# Patient Record
Sex: MALE | Race: WHITE | Employment: OTHER | URBAN - METROPOLITAN AREA
[De-identification: names, ages, dates, MRNs, and addresses within clinical notes are randomized per-mention and may not be internally consistent; named-entity substitution may affect disease eponyms.]

---

## 2017-05-23 ENCOUNTER — OFFICE VISIT (OUTPATIENT)
Dept: CARDIOLOGY CLINIC | Age: 72
End: 2017-05-23

## 2017-05-23 VITALS
SYSTOLIC BLOOD PRESSURE: 138 MMHG | OXYGEN SATURATION: 97 % | BODY MASS INDEX: 33.45 KG/M2 | DIASTOLIC BLOOD PRESSURE: 100 MMHG | WEIGHT: 213.6 LBS | HEART RATE: 72 BPM

## 2017-05-23 DIAGNOSIS — Z95.1 S/P CABG X 3: ICD-10-CM

## 2017-05-23 DIAGNOSIS — Z79.899 ON AMIODARONE THERAPY: ICD-10-CM

## 2017-05-23 DIAGNOSIS — I48.0 PAF (PAROXYSMAL ATRIAL FIBRILLATION) (HCC): Primary | ICD-10-CM

## 2017-05-23 PROCEDURE — 1123F ACP DISCUSS/DSCN MKR DOCD: CPT | Performed by: INTERNAL MEDICINE

## 2017-05-23 PROCEDURE — G8419 CALC BMI OUT NRM PARAM NOF/U: HCPCS | Performed by: INTERNAL MEDICINE

## 2017-05-23 PROCEDURE — 4040F PNEUMOC VAC/ADMIN/RCVD: CPT | Performed by: INTERNAL MEDICINE

## 2017-05-23 PROCEDURE — 99214 OFFICE O/P EST MOD 30 MIN: CPT | Performed by: INTERNAL MEDICINE

## 2017-05-23 PROCEDURE — G8428 CUR MEDS NOT DOCUMENT: HCPCS | Performed by: INTERNAL MEDICINE

## 2017-05-23 PROCEDURE — 1036F TOBACCO NON-USER: CPT | Performed by: INTERNAL MEDICINE

## 2017-05-23 PROCEDURE — G8599 NO ASA/ANTIPLAT THER USE RNG: HCPCS | Performed by: INTERNAL MEDICINE

## 2017-05-23 PROCEDURE — 3017F COLORECTAL CA SCREEN DOC REV: CPT | Performed by: INTERNAL MEDICINE

## 2017-05-23 RX ORDER — AMIODARONE HYDROCHLORIDE 200 MG/1
100 TABLET ORAL DAILY
Qty: 30 TABLET | Refills: 0 | Status: SHIPPED | OUTPATIENT
Start: 2017-05-23 | End: 2017-08-03 | Stop reason: SDUPTHER

## 2017-05-23 RX ORDER — ATORVASTATIN CALCIUM 40 MG/1
40 TABLET, FILM COATED ORAL DAILY
Qty: 90 TABLET | Refills: 0 | Status: SHIPPED | OUTPATIENT
Start: 2017-05-23 | End: 2018-01-19 | Stop reason: SDUPTHER

## 2017-07-13 ENCOUNTER — HOSPITAL ENCOUNTER (OUTPATIENT)
Dept: GENERAL RADIOLOGY | Age: 72
Discharge: HOME | End: 2017-07-13
Attending: INTERNAL MEDICINE | Admitting: INTERNAL MEDICINE

## 2017-07-13 DIAGNOSIS — Z95.1 S/P CABG X 3: ICD-10-CM

## 2017-07-13 LAB
ALBUMIN SERPL-MCNC: 4.5 GM/DL (ref 3.4–5)
ALBUMIN SERPL-MCNC: 4.5 GM/DL (ref 3.4–5)
ALP BLD-CCNC: 61 IU/L (ref 40–129)
ALP BLD-CCNC: 63 IU/L (ref 40–128)
ALT SERPL-CCNC: 17 U/L (ref 10–40)
ALT SERPL-CCNC: 18 U/L (ref 10–40)
ANION GAP SERPL CALCULATED.3IONS-SCNC: 15 MMOL/L (ref 4–16)
AST SERPL-CCNC: 19 IU/L (ref 15–37)
AST SERPL-CCNC: 20 IU/L (ref 15–37)
BASOPHILS ABSOLUTE: 0.1 K/CU MM
BASOPHILS RELATIVE PERCENT: 0.9 % (ref 0–1)
BILIRUB SERPL-MCNC: 0.5 MG/DL (ref 0–1)
BILIRUB SERPL-MCNC: 0.6 MG/DL (ref 0–1)
BILIRUBIN DIRECT: 0.2 MG/DL (ref 0–0.3)
BILIRUBIN, INDIRECT: 0.3 MG/DL (ref 0–0.7)
BUN BLDV-MCNC: 10 MG/DL (ref 6–23)
CALCIUM SERPL-MCNC: 9.4 MG/DL (ref 8.3–10.6)
CHLORIDE BLD-SCNC: 99 MMOL/L (ref 99–110)
CHOLESTEROL: 217 MG/DL
CO2: 24 MMOL/L (ref 21–32)
CREAT SERPL-MCNC: 0.8 MG/DL (ref 0.9–1.3)
DIFFERENTIAL TYPE: ABNORMAL
EOSINOPHILS ABSOLUTE: 0.2 K/CU MM
EOSINOPHILS RELATIVE PERCENT: 3.4 % (ref 0–3)
GFR AFRICAN AMERICAN: >60 ML/MIN/1.73M2
GFR NON-AFRICAN AMERICAN: >60 ML/MIN/1.73M2
GLUCOSE FASTING: 95 MG/DL (ref 70–99)
HCT VFR BLD CALC: 44.7 % (ref 42–52)
HDLC SERPL-MCNC: 59 MG/DL
HEMOGLOBIN: 15.3 GM/DL (ref 13.5–18)
IMMATURE NEUTROPHIL %: 0.1 % (ref 0–0.43)
LDL CHOLESTEROL DIRECT: 157 MG/DL
LYMPHOCYTES ABSOLUTE: 1.9 K/CU MM
LYMPHOCYTES RELATIVE PERCENT: 27.4 % (ref 24–44)
MCH RBC QN AUTO: 32.2 PG (ref 27–31)
MCHC RBC AUTO-ENTMCNC: 34.2 % (ref 32–36)
MCV RBC AUTO: 94.1 FL (ref 78–100)
MONOCYTES ABSOLUTE: 0.7 K/CU MM
MONOCYTES RELATIVE PERCENT: 10.1 % (ref 0–4)
NUCLEATED RBC %: 0 %
PDW BLD-RTO: 12.6 % (ref 11.7–14.9)
PLATELET # BLD: 262 K/CU MM (ref 140–440)
PMV BLD AUTO: 9.3 FL (ref 7.5–11.1)
POTASSIUM SERPL-SCNC: 4.5 MMOL/L (ref 3.5–5.1)
RBC # BLD: 4.75 M/CU MM (ref 4.6–6.2)
SEGMENTED NEUTROPHILS ABSOLUTE COUNT: 4.1 K/CU MM
SEGMENTED NEUTROPHILS RELATIVE PERCENT: 58.1 % (ref 36–66)
SODIUM BLD-SCNC: 138 MMOL/L (ref 135–145)
T3 FREE: 2.3 PG/ML (ref 2.3–4.2)
T4 FREE: 1.48 NG/DL (ref 0.9–1.8)
TOTAL IMMATURE NEUTOROPHIL: 0.01 K/CU MM
TOTAL NUCLEATED RBC: 0 K/CU MM
TOTAL PROTEIN: 7.1 GM/DL (ref 6.4–8.2)
TOTAL PROTEIN: 7.2 GM/DL (ref 6.4–8.2)
TRIGL SERPL-MCNC: 104 MG/DL
TSH HIGH SENSITIVITY: 1.35 UIU/ML (ref 0.27–4.2)
WBC # BLD: 7 K/CU MM (ref 4–10.5)

## 2017-08-03 DIAGNOSIS — I48.0 PAF (PAROXYSMAL ATRIAL FIBRILLATION) (HCC): ICD-10-CM

## 2017-08-03 RX ORDER — AMIODARONE HYDROCHLORIDE 200 MG/1
TABLET ORAL
Qty: 30 TABLET | Refills: 0 | Status: SHIPPED | OUTPATIENT
Start: 2017-08-03 | End: 2017-10-24 | Stop reason: SDUPTHER

## 2017-10-24 DIAGNOSIS — I48.0 PAF (PAROXYSMAL ATRIAL FIBRILLATION) (HCC): ICD-10-CM

## 2017-10-26 RX ORDER — AMIODARONE HYDROCHLORIDE 200 MG/1
100 TABLET ORAL DAILY
Qty: 45 TABLET | Refills: 3 | Status: SHIPPED | OUTPATIENT
Start: 2017-10-26 | End: 2018-02-14 | Stop reason: DRUGHIGH

## 2017-11-27 ENCOUNTER — OFFICE VISIT (OUTPATIENT)
Dept: CARDIOLOGY CLINIC | Age: 72
End: 2017-11-27

## 2017-11-27 VITALS
HEART RATE: 66 BPM | HEIGHT: 67 IN | SYSTOLIC BLOOD PRESSURE: 138 MMHG | WEIGHT: 214.2 LBS | DIASTOLIC BLOOD PRESSURE: 88 MMHG | BODY MASS INDEX: 33.62 KG/M2

## 2017-11-27 DIAGNOSIS — R07.9 CHEST PAIN, UNSPECIFIED TYPE: Primary | ICD-10-CM

## 2017-11-27 DIAGNOSIS — R06.02 SHORTNESS OF BREATH: ICD-10-CM

## 2017-11-27 PROCEDURE — G8599 NO ASA/ANTIPLAT THER USE RNG: HCPCS | Performed by: INTERNAL MEDICINE

## 2017-11-27 PROCEDURE — G8417 CALC BMI ABV UP PARAM F/U: HCPCS | Performed by: INTERNAL MEDICINE

## 2017-11-27 PROCEDURE — 1036F TOBACCO NON-USER: CPT | Performed by: INTERNAL MEDICINE

## 2017-11-27 PROCEDURE — G8484 FLU IMMUNIZE NO ADMIN: HCPCS | Performed by: INTERNAL MEDICINE

## 2017-11-27 PROCEDURE — 4040F PNEUMOC VAC/ADMIN/RCVD: CPT | Performed by: INTERNAL MEDICINE

## 2017-11-27 PROCEDURE — 3017F COLORECTAL CA SCREEN DOC REV: CPT | Performed by: INTERNAL MEDICINE

## 2017-11-27 PROCEDURE — G8427 DOCREV CUR MEDS BY ELIG CLIN: HCPCS | Performed by: INTERNAL MEDICINE

## 2017-11-27 PROCEDURE — 1123F ACP DISCUSS/DSCN MKR DOCD: CPT | Performed by: INTERNAL MEDICINE

## 2017-11-27 PROCEDURE — 93000 ELECTROCARDIOGRAM COMPLETE: CPT | Performed by: INTERNAL MEDICINE

## 2017-11-27 PROCEDURE — 99214 OFFICE O/P EST MOD 30 MIN: CPT | Performed by: INTERNAL MEDICINE

## 2017-11-27 NOTE — PROGRESS NOTES
When Pt was started on Amiodarone:     Test                     Date of last test  EKG                     [x] 11/27/17   PFT                      [x]                                                                         CXR                     [x]7/13/17                                                                        THYROID            [x]7/13/17                                                LFT                      [x]9/28/16                                          EYE EXAM          [x]2017    [id number] Score for Atrial Fibrillation Stroke Risk   Risk   Factors  Component Value   C CHF Yes 1   H HTN No 0   A2 Age >= 76 No,  (73 y.o.) 0   D DM No 0   S2 Prior Stroke/TIA No 0   V Vascular Disease No 0   A Age 74-69 Yes,  (73 y.o.) 1   Sc Sex male 0    [id number]  Score  2   Score last updated 60/76/63 19:71 AM    Click here for a link to the UpToDate guideline \"Atrial Fibrillation: Anticoagulation therapy to prevent embolization    Disclaimer: Risk Score calculation is dependent on accuracy of patient problem list and past encounter diagnosis.

## 2017-12-04 ENCOUNTER — PROCEDURE VISIT (OUTPATIENT)
Dept: CARDIOLOGY CLINIC | Age: 72
End: 2017-12-04

## 2017-12-04 DIAGNOSIS — R07.9 CHEST PAIN, UNSPECIFIED TYPE: ICD-10-CM

## 2017-12-04 DIAGNOSIS — R06.02 SHORTNESS OF BREATH: Primary | ICD-10-CM

## 2017-12-04 LAB
LV EF: 55 %
LVEF MODALITY: NORMAL

## 2017-12-04 PROCEDURE — 93306 TTE W/DOPPLER COMPLETE: CPT | Performed by: INTERNAL MEDICINE

## 2017-12-06 ENCOUNTER — TELEPHONE (OUTPATIENT)
Dept: CARDIOLOGY CLINIC | Age: 72
End: 2017-12-06

## 2018-01-15 ENCOUNTER — HOSPITAL ENCOUNTER (OUTPATIENT)
Dept: PREADMISSION TESTING | Age: 73
Discharge: HOME | End: 2018-01-15
Attending: SURGERY | Admitting: SURGERY

## 2018-01-15 VITALS
OXYGEN SATURATION: 95 % | SYSTOLIC BLOOD PRESSURE: 206 MMHG | RESPIRATION RATE: 16 BRPM | HEART RATE: 80 BPM | TEMPERATURE: 96.9 F | DIASTOLIC BLOOD PRESSURE: 94 MMHG

## 2018-01-15 LAB
ANION GAP SERPL CALCULATED.3IONS-SCNC: 13 MMOL/L (ref 4–16)
APTT: 29.3 SECONDS (ref 21.2–33)
BACTERIA: ABNORMAL /HPF
BASOPHILS ABSOLUTE: 0.1 K/CU MM
BASOPHILS RELATIVE PERCENT: 0.8 % (ref 0–1)
BILIRUBIN URINE: NEGATIVE MG/DL
BLOOD, URINE: NEGATIVE
BUN BLDV-MCNC: 11 MG/DL (ref 6–23)
CHLORIDE BLD-SCNC: 97 MMOL/L (ref 99–110)
CLARITY: CLEAR
CO2: 28 MMOL/L (ref 21–32)
COLOR: ABNORMAL
CREAT SERPL-MCNC: 0.9 MG/DL (ref 0.9–1.3)
DIFFERENTIAL TYPE: ABNORMAL
EOSINOPHILS ABSOLUTE: 0.3 K/CU MM
EOSINOPHILS RELATIVE PERCENT: 3.4 % (ref 0–3)
GFR AFRICAN AMERICAN: >60 ML/MIN/1.73M2
GFR NON-AFRICAN AMERICAN: >60 ML/MIN/1.73M2
GLUCOSE BLD-MCNC: 111 MG/DL (ref 70–99)
GLUCOSE, URINE: NEGATIVE MG/DL
HCT VFR BLD CALC: 42.4 % (ref 42–52)
HEMOGLOBIN: 14.6 GM/DL (ref 13.5–18)
HYALINE CASTS: >20 /LPF
IMMATURE NEUTROPHIL %: 0.3 % (ref 0–0.43)
INR BLD: 1 INDEX
KETONES, URINE: NEGATIVE MG/DL
LEUKOCYTE ESTERASE, URINE: NEGATIVE
LYMPHOCYTES ABSOLUTE: 1.8 K/CU MM
LYMPHOCYTES RELATIVE PERCENT: 19.3 % (ref 24–44)
MCH RBC QN AUTO: 31.1 PG (ref 27–31)
MCHC RBC AUTO-ENTMCNC: 34.4 % (ref 32–36)
MCV RBC AUTO: 90.4 FL (ref 78–100)
MONOCYTES ABSOLUTE: 0.8 K/CU MM
MONOCYTES RELATIVE PERCENT: 8.8 % (ref 0–4)
MUCUS: ABNORMAL HPF
NITRITE URINE, QUANTITATIVE: NEGATIVE
NUCLEATED RBC %: 0 %
PDW BLD-RTO: 12.4 % (ref 11.7–14.9)
PH, URINE: 6 (ref 5–8)
PLATELET # BLD: 320 K/CU MM (ref 140–440)
PMV BLD AUTO: 8.5 FL (ref 7.5–11.1)
POTASSIUM SERPL-SCNC: 4.3 MMOL/L (ref 3.5–5.1)
PROTEIN UA: 30 MG/DL
PROTHROMBIN TIME: 11.4 SECONDS (ref 9.12–12.5)
RBC # BLD: 4.69 M/CU MM (ref 4.6–6.2)
RBC URINE: <1 /HPF (ref 0–3)
SEGMENTED NEUTROPHILS ABSOLUTE COUNT: 6.2 K/CU MM
SEGMENTED NEUTROPHILS RELATIVE PERCENT: 67.4 % (ref 36–66)
SODIUM BLD-SCNC: 138 MMOL/L (ref 135–145)
SPECIFIC GRAVITY UA: 1.02 (ref 1–1.03)
TOTAL IMMATURE NEUTOROPHIL: 0.03 K/CU MM
TOTAL NUCLEATED RBC: 0 K/CU MM
TRICHOMONAS: ABNORMAL /HPF
UROBILINOGEN, URINE: 1 MG/DL (ref 0.2–1)
WBC # BLD: 9.1 K/CU MM (ref 4–10.5)
WBC UA: 1 /HPF (ref 0–2)

## 2018-01-15 RX ORDER — CEFAZOLIN SODIUM 2 G/100ML
2 INJECTION, SOLUTION INTRAVENOUS ONCE
Status: CANCELLED | OUTPATIENT
Start: 2018-01-17

## 2018-01-16 LAB
EKG ATRIAL RATE: 79 BPM
EKG DIAGNOSIS: NORMAL
EKG P AXIS: 22 DEGREES
EKG P-R INTERVAL: 162 MS
EKG Q-T INTERVAL: 462 MS
EKG QRS DURATION: 140 MS
EKG QTC CALCULATION (BAZETT): 529 MS
EKG R AXIS: 9 DEGREES
EKG T AXIS: 11 DEGREES
EKG VENTRICULAR RATE: 79 BPM

## 2018-01-17 PROBLEM — T81.32XA STERNAL WOUND DEHISCENCE: Status: ACTIVE | Noted: 2018-01-17

## 2018-01-19 DIAGNOSIS — Z95.1 S/P CABG X 3: ICD-10-CM

## 2018-01-19 RX ORDER — ATORVASTATIN CALCIUM 40 MG/1
40 TABLET, FILM COATED ORAL DAILY
Qty: 90 TABLET | Refills: 0 | OUTPATIENT
Start: 2018-01-19

## 2018-01-19 NOTE — TELEPHONE ENCOUNTER
Pt needs atorvastatin 40 mg   90 day called into pharmacy. He is being discharged from the hospital today and he only has one pill left.      Wy[address]

## 2018-01-22 RX ORDER — ATORVASTATIN CALCIUM 40 MG/1
40 TABLET, FILM COATED ORAL DAILY
Qty: 90 TABLET | Refills: 3 | Status: SHIPPED | OUTPATIENT
Start: 2018-01-22 | End: 2019-03-17 | Stop reason: SDUPTHER

## 2018-02-14 ENCOUNTER — OFFICE VISIT (OUTPATIENT)
Dept: CARDIOLOGY CLINIC | Age: 73
End: 2018-02-14

## 2018-02-14 VITALS
SYSTOLIC BLOOD PRESSURE: 128 MMHG | DIASTOLIC BLOOD PRESSURE: 88 MMHG | WEIGHT: 214.8 LBS | HEIGHT: 68 IN | BODY MASS INDEX: 32.55 KG/M2 | HEART RATE: 71 BPM

## 2018-02-14 DIAGNOSIS — Z79.899 MEDICATION THERAPY CONTINUED: Primary | ICD-10-CM

## 2018-02-14 PROCEDURE — 1111F DSCHRG MED/CURRENT MED MERGE: CPT | Performed by: INTERNAL MEDICINE

## 2018-02-14 PROCEDURE — 3017F COLORECTAL CA SCREEN DOC REV: CPT | Performed by: INTERNAL MEDICINE

## 2018-02-14 PROCEDURE — G8598 ASA/ANTIPLAT THER USED: HCPCS | Performed by: INTERNAL MEDICINE

## 2018-02-14 PROCEDURE — 1123F ACP DISCUSS/DSCN MKR DOCD: CPT | Performed by: INTERNAL MEDICINE

## 2018-02-14 PROCEDURE — G8427 DOCREV CUR MEDS BY ELIG CLIN: HCPCS | Performed by: INTERNAL MEDICINE

## 2018-02-14 PROCEDURE — 1036F TOBACCO NON-USER: CPT | Performed by: INTERNAL MEDICINE

## 2018-02-14 PROCEDURE — 99214 OFFICE O/P EST MOD 30 MIN: CPT | Performed by: INTERNAL MEDICINE

## 2018-02-14 PROCEDURE — 4040F PNEUMOC VAC/ADMIN/RCVD: CPT | Performed by: INTERNAL MEDICINE

## 2018-02-14 PROCEDURE — G8484 FLU IMMUNIZE NO ADMIN: HCPCS | Performed by: INTERNAL MEDICINE

## 2018-02-14 PROCEDURE — G8417 CALC BMI ABV UP PARAM F/U: HCPCS | Performed by: INTERNAL MEDICINE

## 2018-02-14 PROCEDURE — 93000 ELECTROCARDIOGRAM COMPLETE: CPT | Performed by: INTERNAL MEDICINE

## 2018-02-14 RX ORDER — AMIODARONE HYDROCHLORIDE 100 MG/1
100 TABLET ORAL DAILY
COMMUNITY
End: 2018-09-28 | Stop reason: SDUPTHER

## 2018-02-14 NOTE — PROGRESS NOTES
Hyperlipidemia; Hypertension; S/P CABG x 3; Shortness of breath on exertion; Teeth missing; Vertebral compression fracture ([address] Wears glasses; and [place] teeth extracted. and presents with     Plan:  1. CAD: Continue aspirin, continue statins, betablockers  2. Paroxysmal afib: CHADs score is 2, high risk for fall, therefore off anticoagulation, continue aspirin,  amiodarone to 100mg po daily, normal TSH, T4,T3, PFTS, LFTS AND RECOMMEND opthalmology evaluation,   3. Shortness of breath: repeat echo  4. Post op cataract:stable  5. HTN: stable, continue coreg  6. Hearing loss;recommend ENT evaluation   7. CHEST wall popping of sternum:s/p rewiring of the sternum  8. Dyslipidemia: continue statins and zetia  9. Health maintenance: exerise and diet  All labs, medications and tests reviewed, continue all other medications of all above medical condition listed as is.     @DBAONew [place]@

## 2018-05-17 ENCOUNTER — OFFICE VISIT (OUTPATIENT)
Dept: CARDIOLOGY CLINIC | Age: 73
End: 2018-05-17

## 2018-05-17 VITALS
DIASTOLIC BLOOD PRESSURE: 100 MMHG | WEIGHT: 210 LBS | SYSTOLIC BLOOD PRESSURE: 140 MMHG | HEART RATE: 72 BPM | BODY MASS INDEX: 31.83 KG/M2 | HEIGHT: 68 IN

## 2018-05-17 DIAGNOSIS — I10 ESSENTIAL HYPERTENSION: Primary | ICD-10-CM

## 2018-05-17 DIAGNOSIS — Z79.899 MEDICATION THERAPY CONTINUED: ICD-10-CM

## 2018-05-17 PROCEDURE — 1036F TOBACCO NON-USER: CPT | Performed by: INTERNAL MEDICINE

## 2018-05-17 PROCEDURE — 1123F ACP DISCUSS/DSCN MKR DOCD: CPT | Performed by: INTERNAL MEDICINE

## 2018-05-17 PROCEDURE — G8427 DOCREV CUR MEDS BY ELIG CLIN: HCPCS | Performed by: INTERNAL MEDICINE

## 2018-05-17 PROCEDURE — 99214 OFFICE O/P EST MOD 30 MIN: CPT | Performed by: INTERNAL MEDICINE

## 2018-05-17 PROCEDURE — 3017F COLORECTAL CA SCREEN DOC REV: CPT | Performed by: INTERNAL MEDICINE

## 2018-05-17 PROCEDURE — 4040F PNEUMOC VAC/ADMIN/RCVD: CPT | Performed by: INTERNAL MEDICINE

## 2018-05-17 PROCEDURE — G8598 ASA/ANTIPLAT THER USED: HCPCS | Performed by: INTERNAL MEDICINE

## 2018-05-17 PROCEDURE — 93000 ELECTROCARDIOGRAM COMPLETE: CPT | Performed by: INTERNAL MEDICINE

## 2018-05-17 PROCEDURE — G8417 CALC BMI ABV UP PARAM F/U: HCPCS | Performed by: INTERNAL MEDICINE

## 2018-05-17 RX ORDER — COVID-19 ANTIGEN TEST
KIT MISCELLANEOUS
COMMUNITY

## 2018-05-17 RX ORDER — AMLODIPINE BESYLATE 5 MG/1
7.5 TABLET ORAL DAILY
Qty: 60 TABLET | Refills: 3 | Status: SHIPPED | OUTPATIENT
Start: 2018-05-17 | End: 2018-09-28 | Stop reason: SDUPTHER

## 2018-06-22 ENCOUNTER — OFFICE VISIT (OUTPATIENT)
Dept: CARDIOLOGY CLINIC | Age: 73
End: 2018-06-22

## 2018-06-22 VITALS
BODY MASS INDEX: 32.07 KG/M2 | WEIGHT: 211.6 LBS | HEIGHT: 68 IN | SYSTOLIC BLOOD PRESSURE: 118 MMHG | DIASTOLIC BLOOD PRESSURE: 74 MMHG | HEART RATE: 68 BPM

## 2018-06-22 DIAGNOSIS — Z79.899 ON AMIODARONE THERAPY: Primary | ICD-10-CM

## 2018-06-22 PROCEDURE — 4040F PNEUMOC VAC/ADMIN/RCVD: CPT | Performed by: INTERNAL MEDICINE

## 2018-06-22 PROCEDURE — 3017F COLORECTAL CA SCREEN DOC REV: CPT | Performed by: INTERNAL MEDICINE

## 2018-06-22 PROCEDURE — G8598 ASA/ANTIPLAT THER USED: HCPCS | Performed by: INTERNAL MEDICINE

## 2018-06-22 PROCEDURE — 1036F TOBACCO NON-USER: CPT | Performed by: INTERNAL MEDICINE

## 2018-06-22 PROCEDURE — 1123F ACP DISCUSS/DSCN MKR DOCD: CPT | Performed by: INTERNAL MEDICINE

## 2018-06-22 PROCEDURE — G8417 CALC BMI ABV UP PARAM F/U: HCPCS | Performed by: INTERNAL MEDICINE

## 2018-06-22 PROCEDURE — 99214 OFFICE O/P EST MOD 30 MIN: CPT | Performed by: INTERNAL MEDICINE

## 2018-06-22 PROCEDURE — G8427 DOCREV CUR MEDS BY ELIG CLIN: HCPCS | Performed by: INTERNAL MEDICINE

## 2018-06-22 RX ORDER — AMIODARONE HYDROCHLORIDE 200 MG/1
TABLET ORAL
COMMUNITY
Start: 2018-05-14 | End: 2018-06-22 | Stop reason: ALTCHOICE

## 2018-08-29 ENCOUNTER — HOSPITAL ENCOUNTER (OUTPATIENT)
Dept: LAB | Age: 73
Discharge: HOME | End: 2018-08-29
Attending: INTERNAL MEDICINE | Admitting: INTERNAL MEDICINE

## 2018-08-29 LAB
ALBUMIN SERPL-MCNC: 4.6 GM/DL (ref 3.4–5)
ALP BLD-CCNC: 98 IU/L (ref 40–129)
ALT SERPL-CCNC: 14 U/L (ref 10–40)
AST SERPL-CCNC: 21 IU/L (ref 15–37)
BILIRUB SERPL-MCNC: 0.3 MG/DL (ref 0–1)
BILIRUBIN DIRECT: 0.2 MG/DL (ref 0–0.3)
BILIRUBIN, INDIRECT: 0.1 MG/DL (ref 0–0.7)
T4 FREE: 1.44 NG/DL (ref 0.9–1.8)
TOTAL PROTEIN: 6.9 GM/DL (ref 6.4–8.2)
TSH HIGH SENSITIVITY: 1.89 UIU/ML (ref 0.27–4.2)

## 2018-08-30 LAB — T3 TOTAL: 80 NG/DL

## 2018-09-12 RX ORDER — CARVEDILOL 6.25 MG/1
6.25 TABLET ORAL 2 TIMES DAILY
Qty: 60 TABLET | Refills: 5 | Status: CANCELLED | OUTPATIENT
Start: 2018-09-12

## 2018-09-13 RX ORDER — CARVEDILOL 6.25 MG/1
6.25 TABLET ORAL 2 TIMES DAILY
Qty: 60 TABLET | Refills: 0 | Status: SHIPPED | OUTPATIENT
Start: 2018-09-13 | End: 2018-09-28 | Stop reason: SDUPTHER

## 2018-09-28 ENCOUNTER — OFFICE VISIT (OUTPATIENT)
Dept: CARDIOLOGY CLINIC | Age: 73
End: 2018-09-28
Payer: MEDICARE

## 2018-09-28 VITALS
DIASTOLIC BLOOD PRESSURE: 82 MMHG | HEART RATE: 70 BPM | WEIGHT: 216.6 LBS | HEIGHT: 68 IN | BODY MASS INDEX: 32.83 KG/M2 | SYSTOLIC BLOOD PRESSURE: 128 MMHG

## 2018-09-28 DIAGNOSIS — I48.0 PAROXYSMAL ATRIAL FIBRILLATION (HCC): Primary | ICD-10-CM

## 2018-09-28 DIAGNOSIS — Z79.899 ON AMIODARONE THERAPY: ICD-10-CM

## 2018-09-28 PROCEDURE — 1101F PT FALLS ASSESS-DOCD LE1/YR: CPT | Performed by: INTERNAL MEDICINE

## 2018-09-28 PROCEDURE — 99214 OFFICE O/P EST MOD 30 MIN: CPT | Performed by: INTERNAL MEDICINE

## 2018-09-28 PROCEDURE — 3017F COLORECTAL CA SCREEN DOC REV: CPT | Performed by: INTERNAL MEDICINE

## 2018-09-28 PROCEDURE — G8417 CALC BMI ABV UP PARAM F/U: HCPCS | Performed by: INTERNAL MEDICINE

## 2018-09-28 PROCEDURE — 4040F PNEUMOC VAC/ADMIN/RCVD: CPT | Performed by: INTERNAL MEDICINE

## 2018-09-28 PROCEDURE — G8427 DOCREV CUR MEDS BY ELIG CLIN: HCPCS | Performed by: INTERNAL MEDICINE

## 2018-09-28 PROCEDURE — G8598 ASA/ANTIPLAT THER USED: HCPCS | Performed by: INTERNAL MEDICINE

## 2018-09-28 PROCEDURE — 1036F TOBACCO NON-USER: CPT | Performed by: INTERNAL MEDICINE

## 2018-09-28 RX ORDER — AMIODARONE HYDROCHLORIDE 100 MG/1
100 TABLET ORAL DAILY
Qty: 30 TABLET | Refills: 3 | Status: SHIPPED | OUTPATIENT
Start: 2018-09-28 | End: 2018-11-12 | Stop reason: SDUPTHER

## 2018-09-28 RX ORDER — CARVEDILOL 6.25 MG/1
6.25 TABLET ORAL 2 TIMES DAILY
Qty: 60 TABLET | Refills: 0 | Status: SHIPPED | OUTPATIENT
Start: 2018-09-28 | End: 2019-01-14 | Stop reason: SDUPTHER

## 2018-09-28 RX ORDER — AMLODIPINE BESYLATE 5 MG/1
7.5 TABLET ORAL DAILY
Qty: 60 TABLET | Refills: 3 | Status: SHIPPED | OUTPATIENT
Start: 2018-09-28 | End: 2019-04-01

## 2018-09-28 NOTE — PROGRESS NOTES
Abbie Garcia MD        OFFICE  FOLLOWUP NOTE    Chief complaints: patient is here for management of dizziness, CAD s/p cabg ,AFIB, sternal rewiring for fractured wires  Subjective: patient feels better, no chest pain, no shortness of breath, no dizziness, no palpitations    HPI Radha Lomeli is a 67 y. o.year old who  has a past medical history of Acid reflux; Acute on chronic combined systolic and diastolic heart failure (HCC); CAD (coronary artery disease); CHF (congestive heart failure) ([facility] [address] Fall; H/O cardiovascular stress test; H/O echocardiogram; H/O percutaneous left heart catheterization; History of  24 hr Holter monitoring; History of fractured rib; [place] (hard of hearing); Hx of Doppler echocardiogram; Hyperlipidemia; Hypertension; S/P CABG x 3; Shortness of breath on exertion; Teeth missing; Vertebral compression fracture; Wears glasses; and [place] teeth extracted.  and presents for management of dizziness, CAD s/p cabg ,AFIB, sternal rewiring for fractured wires, which are well controlled      Current Outpatient Prescriptions   Medication Sig Dispense Refill    carvedilol (COREG) 6.25 MG tablet Take 1 tablet by mouth 2 times daily 60 tablet 0    Naproxen Sodium (ALEVE) 220 MG CAPS Take by mouth      amLODIPine (NORVASC) 5 MG tablet Take 1.5 tablets by mouth daily 60 tablet 3    Multiple Vitamins-Minerals (MULTIVITAMIN ADULTS PO) Take by mouth      amiodarone (PACERONE) 100 MG tablet Take 100 mg by mouth daily       atorvastatin (LIPITOR) 40 MG tablet Take 1 tablet by mouth daily \"I Take In The Afternoon\" 90 tablet 3    UNABLE TO FIND Take by mouth daily Over The Counter GH3 Advanced      aspirin 81 MG EC tablet Take 81 mg by mouth every morning Over The Counter 30 tablet 3    Ascorbic Acid (VITAMIN C) 500 MG tablet Take 500 mg by mouth daily Over The Counter      Cholecalciferol (VITAMIN D3) 5000 UNITS TABS Take 5,000 Units by mouth three times a week Over The Counter       No current facility-administered medications for this visit. Allergies: Peanuts [peanut oil]  Past Medical History:   Diagnosis Date    Acid reflux     Acute on chronic combined systolic and diastolic heart failure ([address] 12/19/2015    CAD (coronary artery disease)     Sees Dr. Aster Lundberg CHF (congestive heart failure) [facility])     Fall 08/2016    H/O cardiovascular stress test 12/18/2015    cardiolite-significant ischemia,EF69%, Pt sent to ER.  H/O echocardiogram 12/21/15    EF 55%, Mild TR MR.     H/O percutaneous left heart catheterization 12/19/15    EF 55%. Lt. main 50%, LAD 99% mid, Diag 70%. %,      History of  24 hr Holter monitoring 11/29/2016    rhythm is sinus.  Pr normal, wide QRS- 2 singles ve's , 232 single SVE's, 4 paired, 2 runs of svt    History of fractured rib 08/2016    \"Fell In My Kithchen And Fractured 3 Ribs Right Side\"    [place] (hard of hearing)     Bilateral Hearing Aids    Hx of Doppler echocardiogram 12/04/2017    EF50-60%,normal    Hyperlipidemia     Hypertension     S/P CABG x 3 12/22/2015    SEGAL to LAD & Diag, SVG to Cx M1    Shortness of breath on exertion     Teeth missing     Upper And Lower    Vertebral compression fracture     2014    Wears glasses     [place] teeth extracted     4 [place] Teeth Extracted In Past     Past Surgical History:   Procedure Laterality Date   [id number] [facility]  12/22/2015    CABG (3 Bypasses)    CHOLECYSTECTOMY, LAPAROSCOPIC  2011    EXPLORATION OF UNDESCENDED TESTICLE Bilateral 1955    EYE SURGERY  2017    Cataracts With Lens Implants    OTHER SURGICAL HISTORY  01/17/2018    sternal rewire with hernandez lock plating of sternum     SINUS SURGERY  1970's    WISDOM TOOTH EXTRACTION      4 [place] Teeth Extracted In Past     Family History   Problem Relation Age of Onset    Heart Attack Mother     Heart Disease Mother         Heart Attack    Vision Loss Mother     Heart Disease Father     Arthritis Sister  Hearing Loss Sister     Vision Loss Sister      Social History   Substance Use Topics    Smoking status: Never Smoker    Smokeless tobacco: Never Used    Alcohol use No      [unfilled]  Review of Systems:   · Constitutional: No Fever or Weight Loss   · Eyes: No Decreased Vision  · ENT: No Headaches, Hearing Loss or Vertigo  · Cardiovascular: No chest pain, dyspnea on exertion, palpitations or loss of consciousness  · Respiratory: No cough or wheezing    · Gastrointestinal: No abdominal pain, appetite loss, blood in stools, constipation, diarrhea or heartburn  · Genitourinary: No dysuria, trouble voiding, or hematuria  · Musculoskeletal:  No gait disturbance, weakness or joint complaints  · Integumentary: No rash or pruritis  · Neurological: No TIA or stroke symptoms  · Psychiatric: No anxiety or depression  · Endocrine: No malaise, fatigue or temperature intolerance  · Hematologic/Lymphatic: No bleeding problems, blood clots or swollen lymph nodes  · Allergic/Immunologic: No nasal congestion or hives  All systems negative except as marked. Objective:  /82 (Site: Right Upper Arm, Position: Sitting, Cuff Size: Medium Adult)   Pulse 70   Ht 5' 8\" (1.727 m)   Wt 216 lb 9.6 oz (98.2 kg)   BMI 32.93 kg/m²   Wt Readings from Last 3 Encounters:   09/28/18 216 lb 9.6 oz (98.2 kg)   06/22/18 211 lb 9.6 oz (96 kg)   05/17/18 210 lb (95.3 kg)     Body mass index is 32.93 kg/m². GENERAL - Alert, oriented, pleasant, in no apparent distress,normal grooming  HEENT  pupils are reactive to light and accomodation, cornea intact, conjunctive normal color, ears are normal in exam,throat exam in normal, teeth, gum and palate are normal, oral mucosa is normal without any notation of pallor or cyanosis  Neck - Supple. No jugular venous distention noted. No carotid bruits, no apical -carotid delay  Respiratory:  Normal breath sounds, No respiratory distress, No wheezing, No chest tenderness. ,no use of accessory muscles, diaphragm movement is normal  Cardiovascular: (PMI) apex non displaced,no lifts no thrills, no s3,no s4, Normal heart rate, Normal rhythm, No murmurs, No rubs, No gallops. Carotid arteries pulse and amplitude are normal no bruit, no abdominal bruit noted ( normal abdominal aorta ausculation), femoral arteries pulse and amplitude are normal no bruit, pedal pulses are normal  Femoral pulses have normal amplitude, no bruits   Extremities - No cyanosis, clubbing, or significant edema, no varicose veins    Abdomen  No masses, tenderness, or organomegaly, no hepato-splenomegally, no bruits  Musculoskeletal  No significant edema, no kyphosis or scoliosis, no deformity in any extremity noted, muscle strength and tone are normal  Skin: no ulcer,no scar,no stasis dermatitis   Neurologic  alert oriented times 3,Cranial nerves II through XII are grossly intact. There were no gross focal neurologic abnormalities. All sensory and motor nerves examined and were normal  Psychiatric: normal mood and affect    Lab Results   Component Value Date    TROPONINI <0.006 11/08/2012     BNP:    Lab Results   Component Value Date    BNP 27 11/08/2012     PT/INR:  No results found for: PTINR  Lab Results   Component Value Date    LABA1C 5.4 12/19/2015     Lab Results   Component Value Date    CHOL 217 (H) 07/13/2017    TRIG 104 07/13/2017    HDL 59 07/13/2017    LDLDIRECT 157 (H) 07/13/2017     Lab Results   Component Value Date    ALT 14 08/29/2018    AST 21 08/29/2018     TSH:  No results found for: TSH    Impression:  Roberto Canas is a 67 y. o.year old who  has a past medical history of Acid reflux; Acute on chronic combined systolic and diastolic heart failure (HCC); CAD (coronary artery disease); CHF (congestive heart failure) ([facility] [address] Fall; H/O cardiovascular stress test; H/O echocardiogram; H/O percutaneous left heart catheterization; History of  24 hr Holter monitoring; History of fractured rib; [place] (hard of hearing);  Hx of Doppler echocardiogram; Hyperlipidemia; Hypertension; S/P CABG x 3; Shortness of breath on exertion; Teeth missing; Vertebral compression fracture; Wears glasses; and [place] teeth extracted. and presents with     Plan:  1. CAD: Continue aspirin, continue statins, betablockers  2. Paroxysmal afib: CHADs score is 2, high risk for fall, therefore off anticoagulation, continue aspirin,  amiodarone to 100mg po daily, recheck TSH, T4,T3, PFTS, LFTS AND RECOMMEND opthalmology evaluation,   3. Post op cataract:stable, patient had eye exams  4. HTN: CONTROLLED, continue norvasc to 7.5mg ,continue coreg  5. Hearing loss;recommend ENT evaluation   6. CHEST wall popping of sternum:s/p rewiring of the sternum  7. Dyslipidemia: check lipids  All labs, medications and tests reviewed, continue all other medications of all above medical condition listed as is.

## 2018-11-10 DIAGNOSIS — Z79.899 ON AMIODARONE THERAPY: ICD-10-CM

## 2018-11-10 DIAGNOSIS — I48.0 PAF (PAROXYSMAL ATRIAL FIBRILLATION) (HCC): ICD-10-CM

## 2018-11-12 RX ORDER — AMIODARONE HYDROCHLORIDE 200 MG/1
100 TABLET ORAL DAILY
Qty: 45 TABLET | Refills: 5 | OUTPATIENT
Start: 2018-11-12

## 2018-11-12 RX ORDER — AMIODARONE HYDROCHLORIDE 100 MG/1
100 TABLET ORAL DAILY
Qty: 30 TABLET | Refills: 5 | Status: SHIPPED | OUTPATIENT
Start: 2018-11-12 | End: 2019-07-03 | Stop reason: SDUPTHER

## 2019-01-14 DIAGNOSIS — Z79.899 ON AMIODARONE THERAPY: ICD-10-CM

## 2019-01-14 RX ORDER — CARVEDILOL 6.25 MG/1
6.25 TABLET ORAL 2 TIMES DAILY
Qty: 60 TABLET | Refills: 5 | Status: SHIPPED | OUTPATIENT
Start: 2019-01-14 | End: 2019-05-08

## 2019-02-28 ENCOUNTER — HOSPITAL ENCOUNTER (OUTPATIENT)
Age: 74
Discharge: HOME | End: 2019-02-28
Payer: MEDICARE

## 2019-02-28 LAB
CHOLESTEROL: 184 MG/DL
HDLC SERPL-MCNC: 51 MG/DL
LDL CHOLESTEROL DIRECT: 132 MG/DL
T3 FREE: 2.1 PG/ML (ref 2.3–4.2)
T4 FREE: 1.44 NG/DL (ref 0.9–1.8)
TRIGL SERPL-MCNC: 122 MG/DL
TSH HIGH SENSITIVITY: 1.74 UIU/ML (ref 0.27–4.2)

## 2019-02-28 PROCEDURE — 84443 ASSAY THYROID STIM HORMONE: CPT

## 2019-02-28 PROCEDURE — 83721 ASSAY OF BLOOD LIPOPROTEIN: CPT

## 2019-02-28 PROCEDURE — 80061 LIPID PANEL: CPT

## 2019-02-28 PROCEDURE — 84439 ASSAY OF FREE THYROXINE: CPT

## 2019-02-28 PROCEDURE — 36415 COLL VENOUS BLD VENIPUNCTURE: CPT

## 2019-02-28 PROCEDURE — 84481 FREE ASSAY (FT-3): CPT

## 2019-03-12 ENCOUNTER — TELEPHONE (OUTPATIENT)
Dept: CARDIOLOGY CLINIC | Age: 74
End: 2019-03-12

## 2019-03-17 DIAGNOSIS — Z95.1 S/P CABG X 3: ICD-10-CM

## 2019-03-18 RX ORDER — ATORVASTATIN CALCIUM 40 MG/1
40 TABLET, FILM COATED ORAL DAILY
Qty: 90 TABLET | Refills: 1 | Status: SHIPPED | OUTPATIENT
Start: 2019-03-18 | End: 2019-05-08

## 2019-03-22 ENCOUNTER — TELEPHONE (OUTPATIENT)
Dept: CARDIOLOGY CLINIC | Age: 74
End: 2019-03-22

## 2019-04-01 ENCOUNTER — OFFICE VISIT (OUTPATIENT)
Dept: CARDIOLOGY CLINIC | Age: 74
End: 2019-04-01
Payer: MEDICARE

## 2019-04-01 VITALS
BODY MASS INDEX: 31.71 KG/M2 | WEIGHT: 209.2 LBS | SYSTOLIC BLOOD PRESSURE: 134 MMHG | HEIGHT: 68 IN | HEART RATE: 78 BPM | DIASTOLIC BLOOD PRESSURE: 98 MMHG

## 2019-04-01 DIAGNOSIS — M79.89 LEG SWELLING: ICD-10-CM

## 2019-04-01 DIAGNOSIS — I48.91 ATRIAL FIBRILLATION, UNSPECIFIED TYPE (HCC): Primary | ICD-10-CM

## 2019-04-01 PROCEDURE — 99214 OFFICE O/P EST MOD 30 MIN: CPT | Performed by: INTERNAL MEDICINE

## 2019-04-01 PROCEDURE — 1036F TOBACCO NON-USER: CPT | Performed by: INTERNAL MEDICINE

## 2019-04-01 PROCEDURE — 93000 ELECTROCARDIOGRAM COMPLETE: CPT | Performed by: INTERNAL MEDICINE

## 2019-04-01 PROCEDURE — 1123F ACP DISCUSS/DSCN MKR DOCD: CPT | Performed by: INTERNAL MEDICINE

## 2019-04-01 PROCEDURE — G8427 DOCREV CUR MEDS BY ELIG CLIN: HCPCS | Performed by: INTERNAL MEDICINE

## 2019-04-01 PROCEDURE — G8599 NO ASA/ANTIPLAT THER USE RNG: HCPCS | Performed by: INTERNAL MEDICINE

## 2019-04-01 PROCEDURE — 3017F COLORECTAL CA SCREEN DOC REV: CPT | Performed by: INTERNAL MEDICINE

## 2019-04-01 PROCEDURE — 4040F PNEUMOC VAC/ADMIN/RCVD: CPT | Performed by: INTERNAL MEDICINE

## 2019-04-01 PROCEDURE — G8417 CALC BMI ABV UP PARAM F/U: HCPCS | Performed by: INTERNAL MEDICINE

## 2019-04-01 NOTE — PATIENT INSTRUCTIONS
Please hold on to these instructions the  will call you within 1-9 business days when we receive authorization from your insurance. Venous Ultrasound    WHAT TO EXPECT:   ? This test will take approximately 60 minutes. ? It is an ultrasound of the veins. ? It can look for blood clots in the extremities or  for venous reflux. ? There is no special instructions for this test.     If you are unable to keep this appointment, please notify us 24 hours prior to test at [phone].

## 2019-04-01 NOTE — PROGRESS NOTES
Jorge Luis Alcala MD        OFFICE  FOLLOWUP NOTE    Chief complaints: patient is here for management of CAD s/p cabg ,AFIB, sternal rewiring for fractured wires       Subjective: patient feels better, no chest pain, no shortness of breath, no dizziness, no palpitations    HPI Margaret Doan is a 68 y. o.year old who  has a past medical history of Acid reflux, Acute on chronic combined systolic and diastolic heart failure ([address] CAD (coronary artery disease), CHF (congestive heart failure) ([address] Fall, H/O cardiovascular stress test, H/O echocardiogram, H/O percutaneous left heart catheterization, History of  24 hr Holter monitoring, History of fractured rib, [place] (hard of hearing), Hx of Doppler echocardiogram, Hyperlipidemia, Hypertension, S/P CABG x 3, Shortness of breath on exertion, Teeth missing, Vertebral compression fracture ([address] Wears glasses, and [place] teeth extracted. and presents for management of CAD s/p cabg ,AFIB, sternal rewiring for fractured wires which are well controlled  He stopped norvasc due to leg swelling and is on coreg    Current Outpatient Medications   Medication Sig Dispense Refill    atorvastatin (LIPITOR) 40 MG tablet Take 1 tablet by mouth daily 90 tablet 1    carvedilol (COREG) 6.25 MG tablet Take 1 tablet by mouth 2 times daily 60 tablet 5    amiodarone (PACERONE) 100 MG tablet Take 1 tablet by mouth daily 30 tablet 5    Naproxen Sodium (ALEVE) 220 MG CAPS Take by mouth      Multiple Vitamins-Minerals (MULTIVITAMIN ADULTS PO) Take by mouth      UNABLE TO FIND Take by mouth daily Over The Counter GH3 Advanced      aspirin 81 MG EC tablet Take 81 mg by mouth every morning Over The Counter 30 tablet 3    Ascorbic Acid (VITAMIN C) 500 MG tablet Take 500 mg by mouth daily Over The Counter      Cholecalciferol (VITAMIN D3) 5000 UNITS TABS Take 5,000 Units by mouth three times a week Over The Counter       No current facility-administered medications for this visit. Allergies: Peanuts [peanut oil]  Past Medical History:   Diagnosis Date    Acid reflux     Acute on chronic combined systolic and diastolic heart failure ([address] 12/19/2015    CAD (coronary artery disease)     Sees Dr. Angelita Correa [facility])     Fall 08/2016    H/O cardiovascular stress test 12/18/2015    cardiolite-significant ischemia,EF69%, Pt sent to ER.  H/O echocardiogram 12/21/15    EF 55%, Mild TR MR.     H/O percutaneous left heart catheterization 12/19/15    EF 55%. Lt. main 50%, LAD 99% mid, Diag 70%. %,      History of  24 hr Holter monitoring 11/29/2016    rhythm is sinus.  Pr normal, wide QRS- 2 singles ve's , 232 single SVE's, 4 paired, 2 runs of svt    History of fractured rib 08/2016    \"Fell In My [place]chen And Fractured 3 Ribs Right Side\"    [place] (hard of hearing)     Bilateral Hearing Aids    Hx of Doppler echocardiogram 12/04/2017    EF50-60%,normal    Hyperlipidemia     Hypertension     S/P CABG x 3 12/22/2015    SEGAL to LAD & Diag, SVG to Cx M1    Shortness of breath on exertion     Teeth missing     Upper And Lower    Vertebral compression fracture ([address]     2014    Wears glasses     [place] teeth extracted     4 [place] Teeth Extracted In Past     Past Surgical History:   Procedure Laterality Date   [id number] [facility]  12/22/2015    CABG (3 Bypasses)    CHOLECYSTECTOMY, LAPAROSCOPIC  2011    EXPLORATION OF UNDESCENDED TESTICLE Bilateral 1955    EYE SURGERY  2017    Cataracts With Lens Implants    OTHER SURGICAL HISTORY  01/17/2018    sternal rewire with hernandez lock plating of sternum     SINUS SURGERY  1970's    WISDOM TOOTH EXTRACTION      4 [place] Teeth Extracted In Past     Family History   Problem Relation Age of Onset    Heart Attack Mother     Heart Disease Mother         Heart Attack    Vision Loss Mother     Heart Disease Father     Arthritis Sister     Hearing Loss Sister     Vision Loss Sister Social History     Tobacco Use    Smoking status: Never Smoker    Smokeless tobacco: Never Used   Substance Use Topics    Alcohol use: No      [unfilled]  Review of Systems:   · Constitutional: No Fever or Weight Loss   · Eyes: No Decreased Vision  · ENT: No Headaches, Hearing Loss or Vertigo  · Cardiovascular: No chest pain, dyspnea on exertion, palpitations or loss of consciousness  · Respiratory: No cough or wheezing    · Gastrointestinal: No abdominal pain, appetite loss, blood in stools, constipation, diarrhea or heartburn  · Genitourinary: No dysuria, trouble voiding, or hematuria  · Musculoskeletal:  No gait disturbance, weakness or joint complaints  · Integumentary: No rash or pruritis  · Neurological: No TIA or stroke symptoms  · Psychiatric: No anxiety or depression  · Endocrine: No malaise, fatigue or temperature intolerance  · Hematologic/Lymphatic: No bleeding problems, blood clots or swollen lymph nodes  · Allergic/Immunologic: No nasal congestion or hives  All systems negative except as marked. Objective:  BP (!) 134/98   Pulse 78   Ht 5' 8\" (1.727 m)   Wt 209 lb 3.2 oz (94.9 kg)   BMI 31.81 kg/m²   Wt Readings from Last 3 Encounters:   04/01/19 209 lb 3.2 oz (94.9 kg)   09/28/18 216 lb 9.6 oz (98.2 kg)   06/22/18 211 lb 9.6 oz (96 kg)     Body mass index is 31.81 kg/m². GENERAL - Alert, oriented, pleasant, in no apparent distress,normal grooming  HEENT - pupils are reactive to light and accomodation, cornea intact, conjunctive normal color, ears are normal in exam,throat exam in normal, teeth, gum and palate are normal, oral mucosa is normal without any notation of pallor or cyanosis  Neck - Supple. No jugular venous distention noted. No carotid bruits, no apical -carotid delay  Respiratory:  Normal breath sounds, No respiratory distress, No wheezing, No chest tenderness. ,no use of accessory muscles, diaphragm movement is normal  Cardiovascular: (PMI) apex non displaced,no lifts no Teeth missing, Vertebral compression fracture ([address] Wears glasses, and [place] teeth extracted. and presents with     Plan:  1. CAD: Continue aspirin, continue statins, betablockers  2. Paroxysmal afib: CHADs score is 2, high risk for fall, therefore off anticoagulation, continue aspirin,  amiodarone to 100mg po daily, normal TSH, T4,T3, PFTS, LFTS AND RECOMMEND opthalmology evaluation,   3. Post op cataract:stable, patient had eye exams  4. Leg swelling: resolved after stopping norvasc, check venous doppler  5. HTN: CONTROLLEDcontinue coreg  6. Hearing loss;recommend ENT evaluation   7. CHEST wall popping of sternum:s/p rewiring of the sternum  8. Dyslipidemia: check lipids      All labs, medications and tests reviewed, continue all other medications of all above medical condition listed as is.     [unfilled]

## 2019-04-01 NOTE — PROGRESS NOTES
[id number] Score for Atrial Fibrillation Stroke Risk   Risk   Factors  Component Value   C CHF Yes 1   H HTN Yes 1   A2 Age >= 76 No,  (78 y.o.) 0   D DM No 0   S2 Prior Stroke/TIA No 0   V Vascular Disease No 0   A Age 74-69 Yes,  (78 y.o.) 1   Sc Sex male 0    [id number]  Score  3   Score last updated 3/3/97 0:42 PM    Click here for a link to the UpToDate guideline \"Atrial Fibrillation: Anticoagulation therapy to prevent embolization    Disclaimer: Risk Score calculation is dependent on accuracy of patient problem list and past encounter diagnosis.

## 2019-04-02 ENCOUNTER — TELEPHONE (OUTPATIENT)
Dept: CARDIOLOGY CLINIC | Age: 74
End: 2019-04-02

## 2019-04-02 NOTE — TELEPHONE ENCOUNTER
Called patient to schedule BLE Venous, no answer. Left message for patient to call office to schedule.  Medicare NO AUTH NEEDED OK TO SCHEDULE

## 2019-04-15 ENCOUNTER — PROCEDURE VISIT (OUTPATIENT)
Dept: CARDIOLOGY CLINIC | Age: 74
End: 2019-04-15
Payer: MEDICARE

## 2019-04-15 DIAGNOSIS — M79.89 LEG SWELLING: Primary | ICD-10-CM

## 2019-04-15 PROCEDURE — 93970 EXTREMITY STUDY: CPT | Performed by: INTERNAL MEDICINE

## 2019-04-17 ENCOUNTER — TELEPHONE (OUTPATIENT)
Dept: CARDIOLOGY CLINIC | Age: 74
End: 2019-04-17

## 2019-04-17 NOTE — TELEPHONE ENCOUNTER
Left pt a message that he has an OV to go over his Doppler on 5/8/19 @ 3:20. If this does not work for him he will call back to change it. Doppler:  Significant reflux noted in the Left Popliteal vein.

## 2019-05-08 ENCOUNTER — OFFICE VISIT (OUTPATIENT)
Dept: CARDIOLOGY CLINIC | Age: 74
End: 2019-05-08
Payer: MEDICARE

## 2019-05-08 VITALS
HEIGHT: 68 IN | SYSTOLIC BLOOD PRESSURE: 158 MMHG | WEIGHT: 211 LBS | HEART RATE: 66 BPM | BODY MASS INDEX: 31.98 KG/M2 | DIASTOLIC BLOOD PRESSURE: 100 MMHG

## 2019-05-08 DIAGNOSIS — Z95.1 S/P CABG X 3: ICD-10-CM

## 2019-05-08 DIAGNOSIS — Z79.899 ON AMIODARONE THERAPY: ICD-10-CM

## 2019-05-08 PROCEDURE — 1123F ACP DISCUSS/DSCN MKR DOCD: CPT | Performed by: INTERNAL MEDICINE

## 2019-05-08 PROCEDURE — 3017F COLORECTAL CA SCREEN DOC REV: CPT | Performed by: INTERNAL MEDICINE

## 2019-05-08 PROCEDURE — 99214 OFFICE O/P EST MOD 30 MIN: CPT | Performed by: INTERNAL MEDICINE

## 2019-05-08 PROCEDURE — G8417 CALC BMI ABV UP PARAM F/U: HCPCS | Performed by: INTERNAL MEDICINE

## 2019-05-08 PROCEDURE — G8427 DOCREV CUR MEDS BY ELIG CLIN: HCPCS | Performed by: INTERNAL MEDICINE

## 2019-05-08 PROCEDURE — G8599 NO ASA/ANTIPLAT THER USE RNG: HCPCS | Performed by: INTERNAL MEDICINE

## 2019-05-08 PROCEDURE — 1036F TOBACCO NON-USER: CPT | Performed by: INTERNAL MEDICINE

## 2019-05-08 PROCEDURE — 4040F PNEUMOC VAC/ADMIN/RCVD: CPT | Performed by: INTERNAL MEDICINE

## 2019-05-08 RX ORDER — CARVEDILOL 6.25 MG/1
12.5 TABLET ORAL 2 TIMES DAILY
Qty: 120 TABLET | Refills: 5 | Status: SHIPPED | OUTPATIENT
Start: 2019-05-08 | End: 2021-04-19 | Stop reason: SDUPTHER

## 2019-05-08 RX ORDER — ATORVASTATIN CALCIUM 40 MG/1
80 TABLET, FILM COATED ORAL DAILY
Qty: 180 TABLET | Refills: 1 | Status: SHIPPED | OUTPATIENT
Start: 2019-05-08 | End: 2019-05-13 | Stop reason: SDUPTHER

## 2019-05-08 NOTE — PROGRESS NOTES
Ken Simms MD        OFFICE  FOLLOWUP NOTE    Chief complaints: patient is here for management of CAD s/p cabg ,AFIB, sternal rewiring for fractured wires, HTN, DYSLIPIDEMIA      Subjective: patient feels better, no chest pain, no shortness of breath, no dizziness, no palpitations    HPI Yamile Sanchez is a 68 y. o.year old who  has a past medical history of Acid reflux, Acute on chronic combined systolic and diastolic heart failure ([address] CAD (coronary artery disease), CHF (congestive heart failure) ([address] Fall, H/O cardiovascular stress test, H/O Doppler lower venous ultrasound, H/O echocardiogram, H/O percutaneous left heart catheterization, History of  24 hr Holter monitoring, History of fractured rib, [place] (hard of hearing), Hx of Doppler echocardiogram, Hyperlipidemia, Hypertension, S/P CABG x 3, Shortness of breath on exertion, Teeth missing, Vertebral compression fracture ([address] Wears glasses, and [place] teeth extracted.  and presents for management of CAD s/p cabg ,AFIB, sternal rewiring for fractured wires which are well controlled, HIS BLOOD PRESSURE AND lipids are NOT CONTROLLED    Patient has b/l popliteal veins reflex disease/    Current Outpatient Medications   Medication Sig Dispense Refill    atorvastatin (LIPITOR) 40 MG tablet Take 1 tablet by mouth daily 90 tablet 1    carvedilol (COREG) 6.25 MG tablet Take 1 tablet by mouth 2 times daily 60 tablet 5    amiodarone (PACERONE) 100 MG tablet Take 1 tablet by mouth daily 30 tablet 5    Naproxen Sodium (ALEVE) 220 MG CAPS Take by mouth      Multiple Vitamins-Minerals (MULTIVITAMIN ADULTS PO) Take by mouth      UNABLE TO FIND Take by mouth daily Over The Counter GH3 Advanced      aspirin 81 MG EC tablet Take 81 mg by mouth every morning Over The Counter 30 tablet 3    Ascorbic Acid (VITAMIN C) 500 MG tablet Take 500 mg by mouth daily Over The Counter      Cholecalciferol (VITAMIN D3) 5000 UNITS TABS Take 5,000 Units by mouth three Relation Age of Onset    Heart Attack Mother     Heart Disease Mother         Heart Attack    Vision Loss Mother     Heart Disease Father     Arthritis Sister     Hearing Loss Sister     Vision Loss Sister      Social History     Tobacco Use    Smoking status: Never Smoker    Smokeless tobacco: Never Used   Substance Use Topics    Alcohol use: No      [unfilled]  Review of Systems:   · Constitutional: No Fever or Weight Loss   · Eyes: No Decreased Vision  · ENT: No Headaches, Hearing Loss or Vertigo  · Cardiovascular: No chest pain, dyspnea on exertion, palpitations or loss of consciousness  · Respiratory: No cough or wheezing    · Gastrointestinal: No abdominal pain, appetite loss, blood in stools, constipation, diarrhea or heartburn  · Genitourinary: No dysuria, trouble voiding, or hematuria  · Musculoskeletal:  No gait disturbance, weakness or joint complaints  · Integumentary: No rash or pruritis  · Neurological: No TIA or stroke symptoms  · Psychiatric: No anxiety or depression  · Endocrine: No malaise, fatigue or temperature intolerance  · Hematologic/Lymphatic: No bleeding problems, blood clots or swollen lymph nodes  · Allergic/Immunologic: No nasal congestion or hives  All systems negative except as marked. Objective:  BP (!) 158/100   Pulse 66   Ht 5' 8\" (1.727 m)   Wt 211 lb (95.7 kg)   BMI 32.08 kg/m²   Wt Readings from Last 3 Encounters:   05/08/19 211 lb (95.7 kg)   04/01/19 209 lb 3.2 oz (94.9 kg)   09/28/18 216 lb 9.6 oz (98.2 kg)     Body mass index is 32.08 kg/m². GENERAL - Alert, oriented, pleasant, in no apparent distress,normal grooming  HEENT - pupils are reactive to light and accomodation, cornea intact, conjunctive normal color, ears are normal in exam,throat exam in normal, teeth, gum and palate are normal, oral mucosa is normal without any notation of pallor or cyanosis  Neck - Supple. No jugular venous distention noted.  No carotid bruits, no apical -carotid delay  Respiratory:  Normal breath sounds, No respiratory distress, No wheezing, No chest tenderness. ,no use of accessory muscles, diaphragm movement is normal  Cardiovascular: (PMI) apex non displaced,no lifts no thrills, no s3,no s4, Normal heart rate, Normal rhythm, No murmurs, No rubs, No gallops. Carotid arteries pulse and amplitude are normal no bruit, no abdominal bruit noted ( normal abdominal aorta ausculation), femoral arteries pulse and amplitude are normal no bruit, pedal pulses are normal  Femoral pulses have normal amplitude, no bruits   Extremities - No cyanosis, clubbing, or significant edema, no varicose veins    Abdomen - No masses, tenderness, or organomegaly, no hepato-splenomegally, no bruits  Musculoskeletal + edema, no kyphosis or scoliosis, no deformity in any extremity noted, muscle strength and tone are normal  Skin: no ulcer,no scar,no stasis dermatitis   Neurologic - alert oriented times 3,Cranial nerves II through XII are grossly intact. There were no gross focal neurologic abnormalities. All sensory and motor nerves examined and were normal  Psychiatric: normal mood and affect    Lab Results   Component Value Date    TROPONINI <0.006 11/08/2012     BNP:    Lab Results   Component Value Date    BNP 27 11/08/2012     PT/INR:  No results found for: PTINR  Lab Results   Component Value Date    LABA1C 5.4 12/19/2015     Lab Results   Component Value Date    CHOL 184 02/28/2019    TRIG 122 02/28/2019    HDL 51 02/28/2019    LDLDIRECT 132 (H) 02/28/2019     Lab Results   Component Value Date    ALT 14 08/29/2018    AST 21 08/29/2018     TSH:  No results found for: TSH    Impression:  Cam Pino is a 68 y. o.year old who  has a past medical history of Acid reflux, Acute on chronic combined systolic and diastolic heart failure ([address] CAD (coronary artery disease), CHF (congestive heart failure) ([address] Fall, H/O cardiovascular stress test, H/O Doppler lower venous ultrasound, H/O echocardiogram, H/O

## 2019-05-13 ENCOUNTER — TELEPHONE (OUTPATIENT)
Dept: CARDIOLOGY CLINIC | Age: 74
End: 2019-05-13

## 2019-05-13 RX ORDER — ATORVASTATIN CALCIUM 80 MG/1
80 TABLET, FILM COATED ORAL DAILY
Qty: 90 TABLET | Refills: 3 | Status: SHIPPED | OUTPATIENT
Start: 2019-05-13 | End: 2021-04-19 | Stop reason: SDUPTHER

## 2019-05-13 NOTE — TELEPHONE ENCOUNTER
RX for Lipitor was written for 80 mg daily 40 mg tablets which requires PA. Per [employer] at [facility], resent RX for 80 mg tablets. RX resent to pharmacy.

## 2019-07-03 DIAGNOSIS — Z79.899 ON AMIODARONE THERAPY: ICD-10-CM

## 2019-07-03 RX ORDER — AMIODARONE HYDROCHLORIDE 100 MG/1
100 TABLET ORAL DAILY
Qty: 90 TABLET | Refills: 1 | Status: SHIPPED | OUTPATIENT
Start: 2019-07-03 | End: 2021-04-19 | Stop reason: SDUPTHER

## 2019-10-14 ENCOUNTER — OFFICE VISIT (OUTPATIENT)
Dept: CARDIOLOGY CLINIC | Age: 74
End: 2019-10-14
Payer: MEDICARE

## 2019-10-14 VITALS
BODY MASS INDEX: 31.89 KG/M2 | SYSTOLIC BLOOD PRESSURE: 124 MMHG | DIASTOLIC BLOOD PRESSURE: 70 MMHG | WEIGHT: 210.4 LBS | HEIGHT: 68 IN | HEART RATE: 74 BPM

## 2019-10-14 DIAGNOSIS — E78.5 DYSLIPIDEMIA: ICD-10-CM

## 2019-10-14 DIAGNOSIS — I48.91 ATRIAL FIBRILLATION, UNSPECIFIED TYPE (HCC): Primary | ICD-10-CM

## 2019-10-14 PROCEDURE — 1036F TOBACCO NON-USER: CPT | Performed by: INTERNAL MEDICINE

## 2019-10-14 PROCEDURE — 4040F PNEUMOC VAC/ADMIN/RCVD: CPT | Performed by: INTERNAL MEDICINE

## 2019-10-14 PROCEDURE — 99214 OFFICE O/P EST MOD 30 MIN: CPT | Performed by: INTERNAL MEDICINE

## 2019-10-14 PROCEDURE — G8599 NO ASA/ANTIPLAT THER USE RNG: HCPCS | Performed by: INTERNAL MEDICINE

## 2019-10-14 PROCEDURE — 3017F COLORECTAL CA SCREEN DOC REV: CPT | Performed by: INTERNAL MEDICINE

## 2019-10-14 PROCEDURE — G8484 FLU IMMUNIZE NO ADMIN: HCPCS | Performed by: INTERNAL MEDICINE

## 2019-10-14 PROCEDURE — 93000 ELECTROCARDIOGRAM COMPLETE: CPT | Performed by: INTERNAL MEDICINE

## 2019-10-14 PROCEDURE — 1123F ACP DISCUSS/DSCN MKR DOCD: CPT | Performed by: INTERNAL MEDICINE

## 2019-10-14 PROCEDURE — G8427 DOCREV CUR MEDS BY ELIG CLIN: HCPCS | Performed by: INTERNAL MEDICINE

## 2019-10-14 PROCEDURE — G8417 CALC BMI ABV UP PARAM F/U: HCPCS | Performed by: INTERNAL MEDICINE

## 2020-04-20 RX ORDER — AMIODARONE HYDROCHLORIDE 100 MG/1
100 TABLET ORAL DAILY
Qty: 90 TABLET | Refills: 1 | OUTPATIENT
Start: 2020-04-20

## 2020-04-20 RX ORDER — CARVEDILOL 6.25 MG/1
12.5 TABLET ORAL 2 TIMES DAILY
Qty: 120 TABLET | Refills: 5 | OUTPATIENT
Start: 2020-04-20

## 2021-04-19 DIAGNOSIS — Z79.899 ON AMIODARONE THERAPY: ICD-10-CM

## 2021-04-19 DIAGNOSIS — E78.5 HYPERLIPIDEMIA, UNSPECIFIED HYPERLIPIDEMIA TYPE: Primary | ICD-10-CM

## 2021-04-19 RX ORDER — AMIODARONE HYDROCHLORIDE 100 MG/1
100 TABLET ORAL DAILY
Qty: 90 TABLET | Refills: 0 | Status: SHIPPED | OUTPATIENT
Start: 2021-04-19 | End: 2022-01-24 | Stop reason: SDUPTHER

## 2021-04-19 RX ORDER — CARVEDILOL 6.25 MG/1
12.5 TABLET ORAL 2 TIMES DAILY
Qty: 180 TABLET | Refills: 0 | Status: SHIPPED | OUTPATIENT
Start: 2021-04-19 | End: 2022-01-24 | Stop reason: SDUPTHER

## 2021-04-19 RX ORDER — ATORVASTATIN CALCIUM 80 MG/1
80 TABLET, FILM COATED ORAL DAILY
Qty: 90 TABLET | Refills: 0 | Status: SHIPPED | OUTPATIENT
Start: 2021-04-19 | End: 2022-01-24 | Stop reason: SDUPTHER

## 2021-05-05 DIAGNOSIS — E78.5 HYPERLIPIDEMIA, UNSPECIFIED HYPERLIPIDEMIA TYPE: ICD-10-CM

## 2021-05-05 LAB
ALBUMIN SERPL-MCNC: 4.5 G/DL (ref 3.4–5)
ALP BLD-CCNC: 74 U/L (ref 40–129)
ALT SERPL-CCNC: 13 U/L (ref 10–40)
AST SERPL-CCNC: 18 U/L (ref 15–37)
BILIRUB SERPL-MCNC: 0.5 MG/DL (ref 0–1)
BILIRUBIN DIRECT: <0.2 MG/DL (ref 0–0.3)
BILIRUBIN, INDIRECT: NORMAL MG/DL (ref 0–1)
CHOLESTEROL, TOTAL: 242 MG/DL (ref 0–199)
HDLC SERPL-MCNC: 48 MG/DL (ref 40–60)
LDL CHOLESTEROL CALCULATED: 156 MG/DL
TOTAL PROTEIN: 6.9 G/DL (ref 6.4–8.2)
TRIGL SERPL-MCNC: 188 MG/DL (ref 0–150)
VLDLC SERPL CALC-MCNC: 38 MG/DL

## 2021-05-11 ENCOUNTER — OFFICE VISIT (OUTPATIENT)
Dept: CARDIOLOGY CLINIC | Age: 76
End: 2021-05-11
Payer: MEDICARE

## 2021-05-11 VITALS
WEIGHT: 209.8 LBS | HEIGHT: 68 IN | HEART RATE: 61 BPM | BODY MASS INDEX: 31.8 KG/M2 | DIASTOLIC BLOOD PRESSURE: 80 MMHG | SYSTOLIC BLOOD PRESSURE: 118 MMHG

## 2021-05-11 DIAGNOSIS — Z95.1 S/P CABG X 3: Primary | ICD-10-CM

## 2021-05-11 PROCEDURE — 4040F PNEUMOC VAC/ADMIN/RCVD: CPT | Performed by: INTERNAL MEDICINE

## 2021-05-11 PROCEDURE — 1036F TOBACCO NON-USER: CPT | Performed by: INTERNAL MEDICINE

## 2021-05-11 PROCEDURE — 99214 OFFICE O/P EST MOD 30 MIN: CPT | Performed by: INTERNAL MEDICINE

## 2021-05-11 PROCEDURE — G8427 DOCREV CUR MEDS BY ELIG CLIN: HCPCS | Performed by: INTERNAL MEDICINE

## 2021-05-11 PROCEDURE — 1123F ACP DISCUSS/DSCN MKR DOCD: CPT | Performed by: INTERNAL MEDICINE

## 2021-05-11 PROCEDURE — G8417 CALC BMI ABV UP PARAM F/U: HCPCS | Performed by: INTERNAL MEDICINE

## 2021-05-11 PROCEDURE — 3017F COLORECTAL CA SCREEN DOC REV: CPT | Performed by: INTERNAL MEDICINE

## 2021-05-11 PROCEDURE — 93000 ELECTROCARDIOGRAM COMPLETE: CPT | Performed by: INTERNAL MEDICINE

## 2021-05-11 NOTE — PROGRESS NOTES
[id number] Score for Atrial Fibrillation Stroke Risk   Risk   Factors  Component Value   C CHF Yes 1   H HTN No 0   A2 Age >= 76 Yes,  (69 y.o.) 2   D DM No 0   S2 Prior Stroke/TIA No 0   V Vascular Disease No 0   A Age 74-69 No,  (69 y.o.) 0   Sc Sex male 0    [id number]  Score  3   Score last updated 5/11/21 9:51 AM EDT    Click here for a link to the UpToDate guideline \"Atrial Fibrillation: Anticoagulation therapy to prevent embolization    Disclaimer: Risk Score calculation is dependent on accuracy of patient problem list and past encounter diagnosis.     When Pt was started on Amiodarone:     Test                     Date of last test  EKG                     [x] 5/11/21    PFT                      [x] unknown                                                                         CXR                     [x] 1/18/18                                                                        THYROID            [x] 2/28/19                                                LFT                      [x] 9/28/16                                          EYE EXAM          [x] 2020

## 2021-05-11 NOTE — PATIENT INSTRUCTIONS
Please hold on to these instructions the  will call you within 1-9 business days when we receive authorization from your insurance. Nuclear Stress Test    WHAT TO EXPECT:   ? You will need to confirm the test or it could be cancelled. ? This test will take approximately 2 hours: 1 hour in the AM &    1 hour in the PM. You will be given a time by the Technologist after the first part is completed to come back. ? You will be given a medication, through an IV in the hand, this will safely simulate exercise. This IV is also needed to inject the radioactive isotope unless you are able toe walk the treadmill. ? You will receive an injection in the AM & PM before the pictures. ? Using a special camera, you will have one set of pictures of your heart taken in the AM and a set of pictures in the PM.     PREPARATION FOR TEST:  ? Eat a light breakfast such as water or juice and toast.  ? If you are DIABETIC: Eat a normal breakfast with NO CAFFEINE and take your insulin as normal.   ? AVOID ALL FOODS & DRINKS containing CAFFEINE 12 HOURS PRIOR TO THE TEST: Including coffee, Tea, Bora and other soft drinks even those labeled  caffeine free or decaffeinated.  ? HOLD THESE MEDICATIONS Persantine & Theophylline (Theodur)  24 hours prior & bring your inhaler with you.    The physician will specify if the following Beta blockers need to be held for 24 hours prior to test: Coreg (carvedilol)

## 2021-07-08 NOTE — PROGRESS NOTES
mouth three times a week Over The Counter       No current facility-administered medications for this visit. Allergies: Peanuts [peanut oil]  Past Medical History:   Diagnosis Date    Acid reflux     Acute on chronic combined systolic and diastolic heart failure ([address] 12/19/2015    CAD (coronary artery disease)     Sees Dr. Cash Murillo [facility]congest[facility])     Fall 08/2016    H/O cardiovascular stress test 12/18/2015    cardiolite-significant ischemia,EF69%, Pt sent to ER.  H/O Doppler lower venous ultrasound 04/15/2019    No DVT, Significant reflux noted in the Left Popliteal vein.  H/O echocardiogram 12/21/15    EF 55%, Mild TR MR.     H/O percutaneous left heart catheterization 12/19/15    EF 55%. Lt. main 50%, LAD 99% mid, Diag 70%. %,      History of  24 hr Holter monitoring 11/29/2016    rhythm is sinus.  Pr normal, wide QRS- 2 singles ve's , 232 single SVE's, 4 paired, 2 runs of svt    History of fractured rib 08/2016    \"Fell In My [place] And Fractured 3 Ribs Right Side\"    [place] (hard of hearing)     Bilateral Hearing Aids    Hx of Doppler echocardiogram 12/04/2017    EF50-60%,normal    Hyperlipidemia     Hypertension     S/P CABG x 3 12/22/2015    SEGAL to LAD & Diag, SVG to Cx M1    Shortness of breath on exertion     Teeth missing     Upper And Lower    Vertebral compression fracture ([facility]     2014    Wears glasses     [place] teeth extracted     4 [place] Teeth Extracted In Past     Past Surgical History:   Procedure Laterality Date   [id number] [facility]  12/22/2015    CABG (3 Bypasses)    CHOLECYSTECTOMY, LAPAROSCOPIC  2011    EXPLORATION OF UNDESCENDED TESTICLE Bilateral 1955    EYE SURGERY  2017    Cataracts With Lens Implants    OTHER SURGICAL HISTORY  01/17/2018    sternal rewire with hernandez lock plating of sternum     SINUS SURGERY  1970's    WISDOM TOOTH EXTRACTION      4 [place] Teeth Extracted In Past     Family History Problem Relation Age of Onset    Heart Attack Mother     Heart Disease Mother         Heart Attack    Vision Loss Mother     Heart Disease Father     Arthritis Sister     Hearing Loss Sister     Vision Loss Sister      Social History     Tobacco Use    Smoking status: Never Smoker    Smokeless tobacco: Never Used   Substance Use Topics    Alcohol use: No      [unfilled]  Review of Systems:   · Constitutional: No Fever or Weight Loss   · Eyes: No Decreased Vision  · ENT: No Headaches, Hearing Loss or Vertigo  · Cardiovascular: No chest pain, dyspnea on exertion, palpitations or loss of consciousness  · Respiratory: No cough or wheezing    · Gastrointestinal: No abdominal pain, appetite loss, blood in stools, constipation, diarrhea or heartburn  · Genitourinary: No dysuria, trouble voiding, or hematuria  · Musculoskeletal:  No gait disturbance, weakness or joint complaints  · Integumentary: No rash or pruritis  · Neurological: No TIA or stroke symptoms  · Psychiatric: No anxiety or depression  · Endocrine: No malaise, fatigue or temperature intolerance  · Hematologic/Lymphatic: No bleeding problems, blood clots or swollen lymph nodes  · Allergic/Immunologic: No nasal congestion or hives  All systems negative except as marked. Objective:  /80   Pulse 61   Ht 5' 8\" (1.727 m)   Wt 209 lb 12.8 oz (95.2 kg)   BMI 31.90 kg/m²   Wt Readings from Last 3 Encounters:   05/11/21 209 lb 12.8 oz (95.2 kg)   10/14/19 210 lb 6.4 oz (95.4 kg)   05/08/19 211 lb (95.7 kg)     Body mass index is 31.9 kg/m². GENERAL - Alert, oriented, pleasant, in no apparent distress,normal grooming  HEENT  pupils are intact, cornea intact, conjunctive normal color, ears are normal in exam,  Neck - Supple. No jugular venous distention noted. No carotid bruits, no apical -carotid delay  Respiratory:  Normal breath sounds, No respiratory distress, No wheezing, No chest tenderness. ,no use of accessory muscles, diaphragm movement is Strong peripheral pulses/Capillary refill less/equal to 2 seconds

## 2022-01-24 ENCOUNTER — TELEPHONE (OUTPATIENT)
Dept: CARDIOLOGY CLINIC | Age: 77
End: 2022-01-24

## 2022-01-24 DIAGNOSIS — Z79.899 ON AMIODARONE THERAPY: ICD-10-CM

## 2022-01-24 RX ORDER — CARVEDILOL 12.5 MG/1
12.5 TABLET ORAL 2 TIMES DAILY
Qty: 180 TABLET | Refills: 3
Start: 2022-01-24

## 2022-01-24 RX ORDER — CARVEDILOL 6.25 MG/1
12.5 TABLET ORAL 2 TIMES DAILY
Qty: 180 TABLET | Refills: 0 | Status: SHIPPED | OUTPATIENT
Start: 2022-01-24 | End: 2022-01-24 | Stop reason: SDUPTHER

## 2022-01-24 RX ORDER — AMIODARONE HYDROCHLORIDE 100 MG/1
100 TABLET ORAL DAILY
Qty: 90 TABLET | Refills: 0 | Status: SHIPPED | OUTPATIENT
Start: 2022-01-24

## 2022-01-24 RX ORDER — ATORVASTATIN CALCIUM 80 MG/1
80 TABLET, FILM COATED ORAL DAILY
Qty: 90 TABLET | Refills: 0 | Status: SHIPPED | OUTPATIENT
Start: 2022-01-24

## 2023-03-22 NOTE — PROGRESS NOTES
Surinder Dejesus MD        OFFICE  FOLLOWUP NOTE    Chief complaints: patient is here for management of dizziness, CAD s/p cabg ,AFIB, post op for cataract    Subjective: + hearing loss,+ shortness of breath, no dizziness, no palpitations    HPI Yury Koch is a 67 y. o.year old who  has a past medical history of Acute on chronic combined systolic and diastolic heart failure ([facility] Back fracture; CAD (coronary artery disease); CHF (congestive heart failure) ([facility] H/O cardiovascular stress test; H/O echocardiogram; H/O percutaneous left heart catheterization; History of  24 hr Holter monitoring; Hypertension; and S/P CABG x 3. and presents for management of dizziness, CAD s/p cabg ,AFIB, post op for cataract which are well controlled, his sternotomy wire are dehiscent as shown ON CXR, he did not make follow up appointment with Ruddy Larry. He is getting shortness of breath with walking, he is very hard of hearing      Current Outpatient Prescriptions   Medication Sig Dispense Refill    Multiple Vitamins-Minerals (OCUVITE ADULT 50+ PO) Take by mouth      amiodarone (CORDARONE) 200 MG tablet Take 0.5 tablets by mouth daily 45 tablet 3    atorvastatin (LIPITOR) 40 MG tablet Take 1 tablet by mouth daily 90 tablet 0    naproxen sodium (ALEVE) 220 MG tablet Take 220 mg by mouth 2 times daily (with meals)      aspirin 81 MG EC tablet Take 1 tablet by mouth daily 30 tablet 3    carvedilol (COREG) 6.25 MG tablet Take 1 tablet by mouth 2 times daily 60 tablet 0    docusate sodium (COLACE, DULCOLAX) 100 MG CAPS Take 100 mg by mouth 2 times daily as needed       Ascorbic Acid (VITAMIN C) 500 MG tablet Take 500 mg by mouth daily.  Multiple Vitamins-Minerals (MULTIVITAMIN & MINERAL PO) Take 1 tablet by mouth daily.  Cholecalciferol (VITAMIN D3) 5000 UNITS TABS Take 5,000 Units by mouth three times a week        No current facility-administered medications for this visit.       Allergies: Peanuts [peanut oil]  Past Medical History:   Diagnosis Date    Acute on chronic combined systolic and diastolic heart failure ([facility] 12/19/2015    Back fracture     compression fracture    CAD (coronary artery disease) 12/19/15    CHF (congestive heart failure) ([facility]     H/O cardiovascular stress test 12/18/2015    cardiolite-significant ischemia,EF69%, Pt sent to ER.  H/O echocardiogram 12/21/15    EF 55%, Mild TR MR.     H/O percutaneous left heart catheterization 12/19/15    EF 55%. Lt. main 50%, LAD 99% mid, Diag 70%. %,      History of  24 hr Holter monitoring 11/29/2016    rhythm is sinus.  Pr normal, wide QRS- 2 singles ve's , 232 single SVE's, 4 paired, 2 runs of svt    Hypertension     S/P CABG x 3 12/22/15    SEGAL to LAD & Diag, SVG to Cx M1     Past Surgical History:   Procedure Laterality Date    CARDIAC SURGERY  12/21/2015    CABG X 3    CARDIAC SURGERY  12/12/2015    Triple bypass    CATARACT REMOVAL  [id number]    CHOLECYSTECTOMY Right 2011    EXPLORATION OF UNDESCENDED TESTICLE      OTHER SURGICAL HISTORY  11 08 2012    lap carol    SINUS SURGERY       Family History   Problem Relation Age of Onset    Heart Disease Mother     Heart Disease Father     High Cholesterol Father     Diabetes Father     Asthma Father      Social History   Substance Use Topics    Smoking status: Never Smoker    Smokeless tobacco: Never Used    Alcohol use 0.6 oz/week     1 Glasses of wine per week      Comment: RARE OCCASIONS      [unfilled]  Review of Systems:   · Constitutional: No Fever or Weight Loss   · Eyes: No Decreased Vision  · ENT: No Headaches,+ Hearing Loss or Vertigo  · Cardiovascular: No chest pain,+ dyspnea on exertion, palpitations or loss of consciousness  · Respiratory: No cough or wheezing    · Gastrointestinal: No abdominal pain, appetite loss, blood in stools, constipation, diarrhea or heartburn  · Genitourinary: No dysuria, trouble voiding, or hematuria  · Musculoskeletal:  No gait normal  Skin: no ulcer,no scar,no stasis dermatitis   Neurologic  alert oriented times 3,Cranial nerves II through XII are grossly intact. There were no gross focal neurologic abnormalities. All sensory and motor nerves examined and were normal  Psychiatric: normal mood and affect    Lab Results   Component Value Date    TROPONINI <0.006 11/08/2012     BNP:    Lab Results   Component Value Date    BNP 27 11/08/2012     PT/INR:  No results found for: PTINR  Lab Results   Component Value Date    LABA1C 5.4 12/19/2015     Lab Results   Component Value Date    CHOL 217 (H) 07/13/2017    TRIG 104 07/13/2017    HDL 59 07/13/2017    LDLDIRECT 157 (H) 07/13/2017     Lab Results   Component Value Date    ALT 17 07/13/2017    AST 20 07/13/2017     TSH:  No results found for: TSH    Impression:  Dwight Mejia is a 67 y. o.year old who  has a past medical history of Acute on chronic combined systolic and diastolic heart failure ([facility] [address] Back fracture; CAD (coronary artery disease); CHF (congestive heart failure) ([facility] [address] H/O cardiovascular stress test; H/O echocardiogram; H/O percutaneous left heart catheterization; History of  24 hr Holter monitoring; Hypertension; and S/P CABG x 3. and presents with     Plan:  1. CAD: Continue aspirin, continue statins, betablockers  2. Paroxysmal afib: CHADs score is 2, high risk for fall, therefore off anticoagulation, continue aspirin,  amiodarone to 100mg po daily, normal TSH, T4,T3, PFTS, LFTS AND RECOMMEND opthalmology evaluation,   3. Shortness of breath: repeat echo  4. Post op cataract:stable  5. HTN: stable, continue coreg  6. Hearing loss;recommend ENT evaluation   7. CHEST wall popping of sternum: cxr showed wire dehiscent post op from CABG, refer to Dr. Judith Costa  8. Dyslipidemia: continue statins and zetia  Dizziness: use compression socks, avoid standing suddenly and holter monitor, recommend ENT evalutations. All labs, medications and tests reviewed, continue all other medications of all 22-Mar-2023 20:00

## 2023-06-06 DIAGNOSIS — Z79.899 ON AMIODARONE THERAPY: ICD-10-CM

## 2023-06-06 RX ORDER — CARVEDILOL 12.5 MG/1
12.5 TABLET ORAL 2 TIMES DAILY
Qty: 180 TABLET | Refills: 3 | Status: SHIPPED | OUTPATIENT
Start: 2023-06-06

## 2023-06-06 RX ORDER — AMIODARONE HYDROCHLORIDE 100 MG/1
100 TABLET ORAL DAILY
Qty: 90 TABLET | Refills: 0 | Status: SHIPPED | OUTPATIENT
Start: 2023-06-06

## 2023-06-06 RX ORDER — ATORVASTATIN CALCIUM 80 MG/1
80 TABLET, FILM COATED ORAL DAILY
Qty: 90 TABLET | Refills: 0 | Status: SHIPPED | OUTPATIENT
Start: 2023-06-06

## 2023-06-20 ENCOUNTER — HOSPITAL ENCOUNTER (INPATIENT)
Age: 78
LOS: 1 days | Discharge: HOME | DRG: 194 | End: 2023-06-21
Attending: STUDENT IN AN ORGANIZED HEALTH CARE EDUCATION/TRAINING PROGRAM | Admitting: STUDENT IN AN ORGANIZED HEALTH CARE EDUCATION/TRAINING PROGRAM
Payer: MEDICARE

## 2023-06-20 ENCOUNTER — APPOINTMENT (OUTPATIENT)
Dept: GENERAL RADIOLOGY | Age: 78
DRG: 194 | End: 2023-06-20
Attending: STUDENT IN AN ORGANIZED HEALTH CARE EDUCATION/TRAINING PROGRAM
Payer: MEDICARE

## 2023-06-20 PROBLEM — J18.9 PNEUMONIA DUE TO INFECTIOUS ORGANISM: Status: ACTIVE | Noted: 2023-06-20

## 2023-06-20 PROCEDURE — 93005 ELECTROCARDIOGRAM TRACING: CPT | Performed by: STUDENT IN AN ORGANIZED HEALTH CARE EDUCATION/TRAINING PROGRAM

## 2023-06-20 PROCEDURE — 1200000000 HC SEMI PRIVATE

## 2023-06-20 PROCEDURE — G0378 HOSPITAL OBSERVATION PER HR: HCPCS

## 2023-06-20 PROCEDURE — 71045 X-RAY EXAM CHEST 1 VIEW: CPT

## 2023-06-20 RX ORDER — ONDANSETRON 2 MG/ML
4 INJECTION INTRAMUSCULAR; INTRAVENOUS EVERY 6 HOURS PRN
Status: DISCONTINUED | OUTPATIENT
Start: 2023-06-20 | End: 2023-06-21 | Stop reason: HOSPADM

## 2023-06-20 RX ORDER — ATORVASTATIN CALCIUM 40 MG/1
80 TABLET, FILM COATED ORAL DAILY
Status: DISCONTINUED | OUTPATIENT
Start: 2023-06-21 | End: 2023-06-21 | Stop reason: HOSPADM

## 2023-06-20 RX ORDER — ONDANSETRON 4 MG/1
4 TABLET, ORALLY DISINTEGRATING ORAL EVERY 8 HOURS PRN
Status: DISCONTINUED | OUTPATIENT
Start: 2023-06-20 | End: 2023-06-21 | Stop reason: HOSPADM

## 2023-06-20 RX ORDER — ENOXAPARIN SODIUM 100 MG/ML
40 INJECTION SUBCUTANEOUS DAILY
Status: DISCONTINUED | OUTPATIENT
Start: 2023-06-21 | End: 2023-06-21 | Stop reason: HOSPADM

## 2023-06-20 RX ORDER — ACETAMINOPHEN 650 MG/1
650 SUPPOSITORY RECTAL EVERY 6 HOURS PRN
Status: DISCONTINUED | OUTPATIENT
Start: 2023-06-20 | End: 2023-06-21 | Stop reason: HOSPADM

## 2023-06-20 RX ORDER — POTASSIUM CHLORIDE 20 MEQ/1
40 TABLET, EXTENDED RELEASE ORAL PRN
Status: DISCONTINUED | OUTPATIENT
Start: 2023-06-20 | End: 2023-06-21 | Stop reason: HOSPADM

## 2023-06-20 RX ORDER — SODIUM CHLORIDE 0.9 % (FLUSH) 0.9 %
5-40 SYRINGE (ML) INJECTION EVERY 12 HOURS SCHEDULED
Status: DISCONTINUED | OUTPATIENT
Start: 2023-06-20 | End: 2023-06-21 | Stop reason: HOSPADM

## 2023-06-20 RX ORDER — SODIUM CHLORIDE 9 MG/ML
INJECTION, SOLUTION INTRAVENOUS PRN
Status: DISCONTINUED | OUTPATIENT
Start: 2023-06-20 | End: 2023-06-21 | Stop reason: HOSPADM

## 2023-06-20 RX ORDER — CARVEDILOL 6.25 MG/1
12.5 TABLET ORAL 2 TIMES DAILY
Status: DISCONTINUED | OUTPATIENT
Start: 2023-06-20 | End: 2023-06-21 | Stop reason: HOSPADM

## 2023-06-20 RX ORDER — SODIUM CHLORIDE 0.9 % (FLUSH) 0.9 %
5-40 SYRINGE (ML) INJECTION PRN
Status: DISCONTINUED | OUTPATIENT
Start: 2023-06-20 | End: 2023-06-21 | Stop reason: HOSPADM

## 2023-06-20 RX ORDER — AMIODARONE HYDROCHLORIDE 200 MG/1
100 TABLET ORAL DAILY
Status: DISCONTINUED | OUTPATIENT
Start: 2023-06-21 | End: 2023-06-21 | Stop reason: HOSPADM

## 2023-06-20 RX ORDER — ASPIRIN 81 MG/1
81 TABLET, CHEWABLE ORAL EVERY MORNING
Status: DISCONTINUED | OUTPATIENT
Start: 2023-06-21 | End: 2023-06-21 | Stop reason: HOSPADM

## 2023-06-20 RX ORDER — POLYETHYLENE GLYCOL 3350 17 G/17G
17 POWDER, FOR SOLUTION ORAL DAILY PRN
Status: DISCONTINUED | OUTPATIENT
Start: 2023-06-20 | End: 2023-06-21 | Stop reason: HOSPADM

## 2023-06-20 RX ORDER — MAGNESIUM SULFATE IN WATER 40 MG/ML
2000 INJECTION, SOLUTION INTRAVENOUS PRN
Status: DISCONTINUED | OUTPATIENT
Start: 2023-06-20 | End: 2023-06-21 | Stop reason: HOSPADM

## 2023-06-20 RX ORDER — POTASSIUM CHLORIDE 7.45 MG/ML
10 INJECTION INTRAVENOUS PRN
Status: DISCONTINUED | OUTPATIENT
Start: 2023-06-20 | End: 2023-06-21 | Stop reason: HOSPADM

## 2023-06-20 RX ORDER — ACETAMINOPHEN 325 MG/1
650 TABLET ORAL EVERY 6 HOURS PRN
Status: DISCONTINUED | OUTPATIENT
Start: 2023-06-20 | End: 2023-06-21 | Stop reason: HOSPADM

## 2023-06-20 ASSESSMENT — PAIN SCALES - GENERAL: PAINLEVEL_OUTOF10: 0

## 2023-06-21 VITALS
BODY MASS INDEX: 30.16 KG/M2 | DIASTOLIC BLOOD PRESSURE: 99 MMHG | HEIGHT: 68 IN | HEART RATE: 75 BPM | OXYGEN SATURATION: 95 % | SYSTOLIC BLOOD PRESSURE: 129 MMHG | WEIGHT: 199 LBS | RESPIRATION RATE: 22 BRPM | TEMPERATURE: 97.6 F

## 2023-06-21 PROBLEM — J18.9 COMMUNITY ACQUIRED PNEUMONIA, UNSPECIFIED LATERALITY: Status: ACTIVE | Noted: 2023-06-21

## 2023-06-21 LAB
ALBUMIN SERPL-MCNC: 4.1 GM/DL (ref 3.4–5)
ALP BLD-CCNC: 83 IU/L (ref 40–128)
ALT SERPL-CCNC: 15 U/L (ref 10–40)
ANION GAP SERPL CALCULATED.3IONS-SCNC: 16 MMOL/L (ref 4–16)
AST SERPL-CCNC: 19 IU/L (ref 15–37)
BASOPHILS ABSOLUTE: 0.1 K/CU MM
BASOPHILS RELATIVE PERCENT: 0.4 % (ref 0–1)
BILIRUB SERPL-MCNC: 0.4 MG/DL (ref 0–1)
BUN SERPL-MCNC: 16 MG/DL (ref 6–23)
CALCIUM SERPL-MCNC: 9.2 MG/DL (ref 8.3–10.6)
CHLORIDE BLD-SCNC: 95 MMOL/L (ref 99–110)
CO2: 26 MMOL/L (ref 21–32)
CREAT SERPL-MCNC: 0.8 MG/DL (ref 0.9–1.3)
CRP SERPL HS-MCNC: 3.4 MG/L
DIFFERENTIAL TYPE: ABNORMAL
EOSINOPHILS ABSOLUTE: 0.2 K/CU MM
EOSINOPHILS RELATIVE PERCENT: 1.2 % (ref 0–3)
GFR SERPL CREATININE-BSD FRML MDRD: >60 ML/MIN/1.73M2
GLUCOSE SERPL-MCNC: 125 MG/DL (ref 70–99)
HCT VFR BLD CALC: 41.4 % (ref 42–52)
HEMOGLOBIN: 13.8 GM/DL (ref 13.5–18)
IMMATURE NEUTROPHIL %: 0.4 % (ref 0–0.43)
INR BLD: 1.1 INDEX
LYMPHOCYTES ABSOLUTE: 2.2 K/CU MM
LYMPHOCYTES RELATIVE PERCENT: 16.4 % (ref 24–44)
MCH RBC QN AUTO: 31.7 PG (ref 27–31)
MCHC RBC AUTO-ENTMCNC: 33.3 % (ref 32–36)
MCV RBC AUTO: 95 FL (ref 78–100)
MONOCYTES ABSOLUTE: 1.5 K/CU MM
MONOCYTES RELATIVE PERCENT: 10.8 % (ref 0–4)
NUCLEATED RBC %: 0 %
PDW BLD-RTO: 11.9 % (ref 11.7–14.9)
PLATELET # BLD: 286 K/CU MM (ref 140–440)
PMV BLD AUTO: 9.1 FL (ref 7.5–11.1)
POTASSIUM SERPL-SCNC: 3.9 MMOL/L (ref 3.5–5.1)
PRO-BNP: 1481 PG/ML
PROCALCITONIN SERPL-MCNC: 0.07 NG/ML
PROTHROMBIN TIME: 14.1 SECONDS (ref 11.7–14.5)
RBC # BLD: 4.36 M/CU MM (ref 4.6–6.2)
SEGMENTED NEUTROPHILS ABSOLUTE COUNT: 9.6 K/CU MM
SEGMENTED NEUTROPHILS RELATIVE PERCENT: 70.8 % (ref 36–66)
SODIUM BLD-SCNC: 137 MMOL/L (ref 135–145)
TOTAL IMMATURE NEUTOROPHIL: 0.05 K/CU MM
TOTAL NUCLEATED RBC: 0 K/CU MM
TOTAL PROTEIN: 6.2 GM/DL (ref 6.4–8.2)
WBC # BLD: 13.6 K/CU MM (ref 4–10.5)

## 2023-06-21 PROCEDURE — 85025 COMPLETE CBC W/AUTO DIFF WBC: CPT

## 2023-06-21 PROCEDURE — 84145 PROCALCITONIN (PCT): CPT

## 2023-06-21 PROCEDURE — 6370000000 HC RX 637 (ALT 250 FOR IP): Performed by: STUDENT IN AN ORGANIZED HEALTH CARE EDUCATION/TRAINING PROGRAM

## 2023-06-21 PROCEDURE — 83880 ASSAY OF NATRIURETIC PEPTIDE: CPT

## 2023-06-21 PROCEDURE — 80053 COMPREHEN METABOLIC PANEL: CPT

## 2023-06-21 PROCEDURE — 6360000002 HC RX W HCPCS: Performed by: STUDENT IN AN ORGANIZED HEALTH CARE EDUCATION/TRAINING PROGRAM

## 2023-06-21 PROCEDURE — 85610 PROTHROMBIN TIME: CPT

## 2023-06-21 PROCEDURE — 86140 C-REACTIVE PROTEIN: CPT

## 2023-06-21 PROCEDURE — 36415 COLL VENOUS BLD VENIPUNCTURE: CPT

## 2023-06-21 PROCEDURE — 2580000003 HC RX 258: Performed by: STUDENT IN AN ORGANIZED HEALTH CARE EDUCATION/TRAINING PROGRAM

## 2023-06-21 RX ORDER — AMOXICILLIN 500 MG/1
500 CAPSULE ORAL EVERY 8 HOURS SCHEDULED
Status: DISCONTINUED | OUTPATIENT
Start: 2023-06-21 | End: 2023-06-21 | Stop reason: HOSPADM

## 2023-06-21 RX ORDER — AMOXICILLIN 500 MG/1
500 CAPSULE ORAL EVERY 8 HOURS SCHEDULED
Qty: 14 CAPSULE | Refills: 0 | Status: SHIPPED | OUTPATIENT
Start: 2023-06-21 | End: 2023-06-26

## 2023-06-21 RX ORDER — AZITHROMYCIN 250 MG/1
250 TABLET, FILM COATED ORAL DAILY
Qty: 3 TABLET | Refills: 0 | Status: SHIPPED | OUTPATIENT
Start: 2023-06-22 | End: 2023-06-25

## 2023-06-21 RX ORDER — ASPIRIN 81 MG/1
81 TABLET, CHEWABLE ORAL EVERY MORNING
Qty: 30 TABLET | Refills: 3 | Status: SHIPPED | OUTPATIENT
Start: 2023-06-22

## 2023-06-21 RX ORDER — AZITHROMYCIN 250 MG/1
250 TABLET, FILM COATED ORAL DAILY
Status: DISCONTINUED | OUTPATIENT
Start: 2023-06-21 | End: 2023-06-21 | Stop reason: HOSPADM

## 2023-06-21 RX ADMIN — AMOXICILLIN 500 MG: 500 CAPSULE ORAL at 15:18

## 2023-06-21 RX ADMIN — CARVEDILOL 12.5 MG: 6.25 TABLET, FILM COATED ORAL at 09:58

## 2023-06-21 RX ADMIN — AZITHROMYCIN MONOHYDRATE 250 MG: 250 TABLET ORAL at 09:58

## 2023-06-21 RX ADMIN — SODIUM CHLORIDE, PRESERVATIVE FREE 10 ML: 5 INJECTION INTRAVENOUS at 00:02

## 2023-06-21 RX ADMIN — ATORVASTATIN CALCIUM 80 MG: 40 TABLET, FILM COATED ORAL at 09:58

## 2023-06-21 RX ADMIN — ASPIRIN 81 MG: 81 TABLET, CHEWABLE ORAL at 09:58

## 2023-06-21 RX ADMIN — ENOXAPARIN SODIUM 40 MG: 100 INJECTION SUBCUTANEOUS at 09:59

## 2023-06-21 RX ADMIN — CARVEDILOL 12.5 MG: 6.25 TABLET, FILM COATED ORAL at 00:02

## 2023-06-21 RX ADMIN — SODIUM CHLORIDE, PRESERVATIVE FREE 10 ML: 5 INJECTION INTRAVENOUS at 10:03

## 2023-06-21 RX ADMIN — AMOXICILLIN 500 MG: 500 CAPSULE ORAL at 05:55

## 2023-06-21 RX ADMIN — AMIODARONE HYDROCHLORIDE 100 MG: 200 TABLET ORAL at 09:58

## 2023-06-21 NOTE — H&P
V2.0  History and Physical      Name:  Sean Acevedo /Age/Sex: 1945  (79 y.o. male)   MRN & CSN:  [id number] & [id number] Encounter Date/Time: 2023 10:03 PM EDT   Location:  [facility]0/3010-A PCP: Cam La MD       Assessment and Plan:   Sean Acevedo is a 68 y.o. male with past medical history of Diastolic heart failure, Hypertension, Hyperlipidemia, CAD s/p CABG, A-Fib who presents with generalized weakness. Presyncope   Probably related to underlying infection vs vasovagal syncope  EKG from Replaced by [facility] per report is similar to prior from 06/15/23, no high grade AV blocks mentioned. Troponin negative  Orthostatic blood pressure  Repeat EKG  Fall precautions    Pneumonia with concern for Gram + organism  CXR with Left lower lobe consolidation, probably in the location of prior scarring. No risk factors for MRSA/PSAR  PSI 87 points class III risk  Amoxicillin and Azithromycin  Sputum Cx    Chronic Diastolic heart Failure   No evidence of exacerbation  Most recent Echocardiogram from  with EF 50-60%  Continue home regimen    Hypertension  Continue Carvedilol    CAD s/p CABG in   Aspirin and statin    Paroxysmal A-fib  Continue Amiodarone   Not on [facility] per primary cardiologist due to recurrent falls    Obesity, BMI 30.26    Observation/Telemetry  Full Code    Disposition:   Current Living situation: Home   Expected Disposition: Home  Estimated D/C: 1-2 days    Diet ADULT DIET; Regular; Low Sodium (2 gm)   DVT Prophylaxis [x] Lovenox, []  Heparin, [] SCDs, [] Ambulation,  [] Eliquis, [] Xarelto, [] Coumadin   Code Status Full Code   Surrogate Decision Maker/ SHERIN Mireles Cha     Personally reviewed Lab Studies and Imaging.      History from:     Patient  EMR    History of Present Illness:     Chief Complaint: Nausea, Diaphoresis, weakness    Sean Acevedo is a 68 y.o. male with past medical history of Combined heart failure, Hypertension, Hyperlipidemia, CAD s/p CABG, A-Fib who presents with

## 2023-06-21 NOTE — PROGRESS NOTES
4 Eyes Skin Assessment     NAME:  Sean Acevedo  YOB: 1945  MEDICAL RECORD NUMBER:  [id number]    The patient is being assessed for  Admission    I agree that at least one RN has performed a thorough Head to Toe Skin Assessment on the patient. ALL assessment sites listed below have been assessed. Areas assessed by both nurses:    Head, Face, Ears, Shoulders, Back, Chest, Arms, Elbows, Hands, Sacrum. Buttock, Coccyx, Ischium, Legs. Feet and Heels, and Under Medical Devices         Does the Patient have a Wound?  No noted wound(s)       [place] Prevention initiated by RN: No  Wound Care Orders initiated by RN: No    Pressure Injury (Stage 3,4, Unstageable, DTI, NWPT, and Complex wounds) if present, place Wound referral order by RN under : No    New Ostomies, if present place, Ostomy referral order under : No     Nurse 1 eSignature: Electronically signed by Fannie Owens RN on 6/20/23 at 8:53 PM EDT    **SHARE this note so that the co-signing nurse can place an eSignature**    Nurse 2 eSignature: Electronically signed by Zach Irby RN on 6/20/23 at 10:28 PM EDT

## 2023-06-21 NOTE — DISCHARGE SUMMARY
01/15/2018 08:00 AM    SPECGRAV 1.020 01/15/2018 08:00 AM    LEUKOCYTESUR NEGATIVE 01/15/2018 08:00 AM    UROBILINOGEN 1.0 01/15/2018 08:00 AM    BILIRUBINUR NEGATIVE 01/15/2018 08:00 AM    BLOODU NEGATIVE 01/15/2018 08:00 AM    KETUA NEGATIVE 01/15/2018 08:00 AM     Urine Cultures: No results found for: LABURIN  Blood Cultures: No results found for: BC  No results found for: BLOODCULT2  Organism: No results found for: ORG    Time Spent Discharging patient 35 minutes    Electronically signed by William Hastings MD on 6/21/2023 at 1:05 PM

## 2023-06-21 NOTE — PROGRESS NOTES
Outpatient Pharmacy Progress Note for Meds-to-Beds    Total number of Prescriptions Filled: 3  The following medications were dispensed to the patient during the discharge process:  Amoxicillin  Aspirin  Azithromycin    Additional Documentation:  Patient picked-up the medication(s) in the OP Pharmacy      Thank you for letting us serve your patients.   [address] [place]    [address], [address] [facility]    Phone: [phone]    Fax: [phone]

## 2023-06-23 LAB
EKG ATRIAL RATE: 69 BPM
EKG DIAGNOSIS: NORMAL
EKG P AXIS: 23 DEGREES
EKG P-R INTERVAL: 172 MS
EKG Q-T INTERVAL: 478 MS
EKG QRS DURATION: 132 MS
EKG QTC CALCULATION (BAZETT): 512 MS
EKG R AXIS: 84 DEGREES
EKG T AXIS: 14 DEGREES
EKG VENTRICULAR RATE: 69 BPM

## 2024-03-10 ENCOUNTER — APPOINTMENT (OUTPATIENT)
Dept: CT IMAGING | Age: 79
DRG: 065 | End: 2024-03-10
Payer: MEDICARE

## 2024-03-10 ENCOUNTER — HOSPITAL ENCOUNTER (INPATIENT)
Age: 79
LOS: 3 days | Discharge: HOSPICE | DRG: 065 | End: 2024-03-15
Attending: EMERGENCY MEDICINE | Admitting: STUDENT IN AN ORGANIZED HEALTH CARE EDUCATION/TRAINING PROGRAM
Payer: MEDICARE

## 2024-03-10 DIAGNOSIS — R29.90 STROKE-LIKE SYMPTOMS: Primary | ICD-10-CM
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
## 2024-03-10 DIAGNOSIS — R53.1 RIGHT SIDED WEAKNESS: ICD-10-CM

## 2024-03-10 DIAGNOSIS — R94.31 ACUTE ELECTROCARDIOGRAM CHANGES: ICD-10-CM

## 2024-03-10 LAB
ALBUMIN SERPL-MCNC: 3.8 GM/DL (ref 3.4–5)
ALP BLD-CCNC: 75 IU/L (ref 40–129)
ALT SERPL-CCNC: 13 U/L (ref 10–40)
ANION GAP SERPL CALCULATED.3IONS-SCNC: 12 MMOL/L (ref 7–16)
APTT: 25.4 SECONDS (ref 25.1–37.1)
AST SERPL-CCNC: 20 IU/L (ref 15–37)
BASE EXCESS MIXED: 2.8 (ref 0–3)
BASOPHILS ABSOLUTE: 0 K/CU MM
BASOPHILS RELATIVE PERCENT: 0.3 % (ref 0–1)
BILIRUB SERPL-MCNC: 0.6 MG/DL (ref 0–1)
BILIRUBIN URINE: NEGATIVE MG/DL
BLOOD, URINE: NEGATIVE
BUN SERPL-MCNC: 15 MG/DL (ref 6–23)
CALCIUM SERPL-MCNC: 8.4 MG/DL (ref 8.3–10.6)
CHLORIDE BLD-SCNC: 96 MMOL/L (ref 99–110)
CLARITY: CLEAR
CO2: 25 MMOL/L (ref 21–32)
COLOR: YELLOW
COMMENT UA: ABNORMAL
COMMENT: ABNORMAL
CREAT SERPL-MCNC: 0.7 MG/DL (ref 0.9–1.3)
DIFFERENTIAL TYPE: ABNORMAL
EOSINOPHILS ABSOLUTE: 0 K/CU MM
EOSINOPHILS RELATIVE PERCENT: 0.1 % (ref 0–3)
GFR SERPL CREATININE-BSD FRML MDRD: >60 ML/MIN/1.73M2
GLUCOSE SERPL-MCNC: 146 MG/DL (ref 70–99)
GLUCOSE, URINE: NEGATIVE MG/DL
HCO3 VENOUS: 28.5 MMOL/L (ref 22–29)
HCT VFR BLD CALC: 37.5 % (ref 42–52)
HEMOGLOBIN: 12.6 GM/DL (ref 13.5–18)
IMMATURE NEUTROPHIL %: 0.3 % (ref 0–0.43)
INR BLD: 1.2 INDEX
KETONES, URINE: ABNORMAL MG/DL
LACTATE: 2.1 MMOL/L (ref 0.5–1.9)
LACTIC ACID, SEPSIS: 1.7 MMOL/L (ref 0.4–2)
LEUKOCYTE ESTERASE, URINE: NEGATIVE
LIPASE: 9 IU/L (ref 13–60)
LYMPHOCYTES ABSOLUTE: 1 K/CU MM
LYMPHOCYTES RELATIVE PERCENT: 8 % (ref 24–44)
MAGNESIUM: 2 MG/DL (ref 1.8–2.4)
MCH RBC QN AUTO: 31.1 PG (ref 27–31)
MCHC RBC AUTO-ENTMCNC: 33.6 % (ref 32–36)
MCV RBC AUTO: 92.6 FL (ref 78–100)
MONOCYTES ABSOLUTE: 0.8 K/CU MM
MONOCYTES RELATIVE PERCENT: 6.3 % (ref 0–4)
NITRITE URINE, QUANTITATIVE: NEGATIVE
NUCLEATED RBC %: 0 %
O2 SAT, VEN: 86.6 % (ref 50–70)
PCO2, VEN: 47 MMHG (ref 41–51)
PDW BLD-RTO: 11.7 % (ref 11.7–14.9)
PH VENOUS: 7.39 (ref 7.32–7.43)
PH, URINE: 8 (ref 5–8)
PLATELET # BLD: 273 K/CU MM (ref 140–440)
PMV BLD AUTO: 9 FL (ref 7.5–11.1)
PO2, VEN: 61 MMHG (ref 28–48)
POTASSIUM SERPL-SCNC: 4.4 MMOL/L (ref 3.5–5.1)
PRO-BNP: 1047 PG/ML
PROTEIN UA: NEGATIVE MG/DL
PROTHROMBIN TIME: 15.4 SECONDS (ref 11.7–14.5)
RBC # BLD: 4.05 M/CU MM (ref 4.6–6.2)
SEGMENTED NEUTROPHILS ABSOLUTE COUNT: 10.3 K/CU MM
SEGMENTED NEUTROPHILS RELATIVE PERCENT: 85 % (ref 36–66)
SODIUM BLD-SCNC: 133 MMOL/L (ref 135–145)
SPECIFIC GRAVITY UA: 1.01 (ref 1–1.03)
TOTAL CK: 67 IU/L (ref 38–174)
TOTAL IMMATURE NEUTOROPHIL: 0.04 K/CU MM
TOTAL NUCLEATED RBC: 0 K/CU MM
TOTAL PROTEIN: 6.1 GM/DL (ref 6.4–8.2)
TROPONIN, HIGH SENSITIVITY: 10 NG/L (ref 0–22)
UROBILINOGEN, URINE: 0.2 MG/DL (ref 0.2–1)
WBC # BLD: 12.1 K/CU MM (ref 4–10.5)

## 2024-03-10 PROCEDURE — 70498 CT ANGIOGRAPHY NECK: CPT

## 2024-03-10 PROCEDURE — 99285 EMERGENCY DEPT VISIT HI MDM: CPT

## 2024-03-10 PROCEDURE — 6360000004 HC RX CONTRAST MEDICATION: Performed by: EMERGENCY MEDICINE

## 2024-03-10 PROCEDURE — 81003 URINALYSIS AUTO W/O SCOPE: CPT

## 2024-03-10 PROCEDURE — 36415 COLL VENOUS BLD VENIPUNCTURE: CPT

## 2024-03-10 PROCEDURE — 70496 CT ANGIOGRAPHY HEAD: CPT

## 2024-03-10 PROCEDURE — G0378 HOSPITAL OBSERVATION PER HR: HCPCS

## 2024-03-10 PROCEDURE — 96372 THER/PROPH/DIAG INJ SC/IM: CPT

## 2024-03-10 PROCEDURE — 83605 ASSAY OF LACTIC ACID: CPT

## 2024-03-10 PROCEDURE — 85730 THROMBOPLASTIN TIME PARTIAL: CPT

## 2024-03-10 PROCEDURE — 85025 COMPLETE CBC W/AUTO DIFF WBC: CPT

## 2024-03-10 PROCEDURE — 74174 CTA ABD&PLVS W/CONTRAST: CPT

## 2024-03-10 PROCEDURE — 80053 COMPREHEN METABOLIC PANEL: CPT

## 2024-03-10 PROCEDURE — 70450 CT HEAD/BRAIN W/O DYE: CPT

## 2024-03-10 PROCEDURE — 82550 ASSAY OF CK (CPK): CPT

## 2024-03-10 PROCEDURE — 72125 CT NECK SPINE W/O DYE: CPT

## 2024-03-10 PROCEDURE — 83690 ASSAY OF LIPASE: CPT

## 2024-03-10 PROCEDURE — 93005 ELECTROCARDIOGRAM TRACING: CPT | Performed by: EMERGENCY MEDICINE

## 2024-03-10 PROCEDURE — 82805 BLOOD GASES W/O2 SATURATION: CPT

## 2024-03-10 PROCEDURE — 6360000002 HC RX W HCPCS: Performed by: INTERNAL MEDICINE

## 2024-03-10 PROCEDURE — 84484 ASSAY OF TROPONIN QUANT: CPT

## 2024-03-10 PROCEDURE — 83735 ASSAY OF MAGNESIUM: CPT

## 2024-03-10 PROCEDURE — 2580000003 HC RX 258: Performed by: INTERNAL MEDICINE

## 2024-03-10 PROCEDURE — 85610 PROTHROMBIN TIME: CPT

## 2024-03-10 PROCEDURE — 83880 ASSAY OF NATRIURETIC PEPTIDE: CPT

## 2024-03-10 RX ORDER — SODIUM CHLORIDE 0.9 % (FLUSH) 0.9 %
5-40 SYRINGE (ML) INJECTION EVERY 12 HOURS SCHEDULED
Status: DISCONTINUED | OUTPATIENT
Start: 2024-03-10 | End: 2024-03-15 | Stop reason: HOSPADM

## 2024-03-10 RX ORDER — ONDANSETRON 2 MG/ML
4 INJECTION INTRAMUSCULAR; INTRAVENOUS EVERY 6 HOURS PRN
Status: DISCONTINUED | OUTPATIENT
Start: 2024-03-10 | End: 2024-03-15 | Stop reason: HOSPADM

## 2024-03-10 RX ORDER — POTASSIUM CHLORIDE 20 MEQ/1
40 TABLET, EXTENDED RELEASE ORAL PRN
Status: DISCONTINUED | OUTPATIENT
Start: 2024-03-10 | End: 2024-03-15 | Stop reason: HOSPADM

## 2024-03-10 RX ORDER — POTASSIUM CHLORIDE 7.45 MG/ML
10 INJECTION INTRAVENOUS PRN
Status: DISCONTINUED | OUTPATIENT
Start: 2024-03-10 | End: 2024-03-15 | Stop reason: HOSPADM

## 2024-03-10 RX ORDER — AMIODARONE HYDROCHLORIDE 200 MG/1
100 TABLET ORAL DAILY
Status: DISCONTINUED | OUTPATIENT
Start: 2024-03-11 | End: 2024-03-12

## 2024-03-10 RX ORDER — ACETAMINOPHEN 650 MG/1
650 SUPPOSITORY RECTAL EVERY 6 HOURS PRN
Status: DISCONTINUED | OUTPATIENT
Start: 2024-03-10 | End: 2024-03-15 | Stop reason: HOSPADM

## 2024-03-10 RX ORDER — ONDANSETRON 4 MG/1
4 TABLET, ORALLY DISINTEGRATING ORAL EVERY 8 HOURS PRN
Status: DISCONTINUED | OUTPATIENT
Start: 2024-03-10 | End: 2024-03-15 | Stop reason: HOSPADM

## 2024-03-10 RX ORDER — ACETAMINOPHEN 325 MG/1
650 TABLET ORAL EVERY 6 HOURS PRN
Status: DISCONTINUED | OUTPATIENT
Start: 2024-03-10 | End: 2024-03-15 | Stop reason: HOSPADM

## 2024-03-10 RX ORDER — ASPIRIN 81 MG/1
81 TABLET, CHEWABLE ORAL EVERY MORNING
Status: DISCONTINUED | OUTPATIENT
Start: 2024-03-11 | End: 2024-03-12

## 2024-03-10 RX ORDER — PANTOPRAZOLE SODIUM 40 MG/1
40 TABLET, DELAYED RELEASE ORAL
Status: DISCONTINUED | OUTPATIENT
Start: 2024-03-11 | End: 2024-03-15 | Stop reason: HOSPADM

## 2024-03-10 RX ORDER — SODIUM CHLORIDE 0.9 % (FLUSH) 0.9 %
5-40 SYRINGE (ML) INJECTION PRN
Status: DISCONTINUED | OUTPATIENT
Start: 2024-03-10 | End: 2024-03-15 | Stop reason: HOSPADM

## 2024-03-10 RX ORDER — ATORVASTATIN CALCIUM 40 MG/1
80 TABLET, FILM COATED ORAL DAILY
Status: DISCONTINUED | OUTPATIENT
Start: 2024-03-10 | End: 2024-03-12

## 2024-03-10 RX ORDER — MAGNESIUM SULFATE IN WATER 40 MG/ML
2000 INJECTION, SOLUTION INTRAVENOUS PRN
Status: DISCONTINUED | OUTPATIENT
Start: 2024-03-10 | End: 2024-03-15 | Stop reason: HOSPADM

## 2024-03-10 RX ORDER — ENOXAPARIN SODIUM 100 MG/ML
40 INJECTION SUBCUTANEOUS DAILY
Status: DISCONTINUED | OUTPATIENT
Start: 2024-03-10 | End: 2024-03-11

## 2024-03-10 RX ORDER — POLYETHYLENE GLYCOL 3350 17 G/17G
17 POWDER, FOR SOLUTION ORAL DAILY PRN
Status: DISCONTINUED | OUTPATIENT
Start: 2024-03-10 | End: 2024-03-15 | Stop reason: HOSPADM

## 2024-03-10 RX ORDER — SODIUM CHLORIDE 9 MG/ML
INJECTION, SOLUTION INTRAVENOUS PRN
Status: DISCONTINUED | OUTPATIENT
Start: 2024-03-10 | End: 2024-03-15 | Stop reason: HOSPADM

## 2024-03-10 RX ADMIN — IOPAMIDOL 75 ML: 755 INJECTION, SOLUTION INTRAVENOUS at 15:30

## 2024-03-10 RX ADMIN — ENOXAPARIN SODIUM 40 MG: 100 INJECTION SUBCUTANEOUS at 18:37

## 2024-03-10 RX ADMIN — SODIUM CHLORIDE, PRESERVATIVE FREE 10 ML: 5 INJECTION INTRAVENOUS at 22:04

## 2024-03-10 NOTE — ED NOTES
ED TO INPATIENT SBAR HANDOFF    Patient Name: Eyal Anguiano   :  1945  78 y.o.   Preferred Name  Eyal   Family/Caregiver Present no   Restraints no   C-SSRS: Risk of Suicide: No Risk  Sitter no   Sepsis Risk Score Sepsis Risk Score: 2.28      Situation  Chief Complaint   Patient presents with    Stroke     LKW 0800     Brief Description of Patient's Condition: Patient is A & O x 4. Patient is hard of hearing. Patient uses urinal at bedside. Patient has decreases  in right hand. Patient was found down at home but denies injury. Patient states he had weakness in his right arm. Resps even and unlabored.   Mental Status: oriented, alert, coherent, logical, thought processes intact, and able to concentrate and follow conversation  Arrived from: home    Imaging:   CTA CHEST ABDOMEN PELVIS W CONTRAST   Final Result      CHEST:      Bilateral posterior lower lobe and lingular segment atelectasis.      No acute findings in the chest.         ABDOMEN/PELVIS:      Status post cholecystectomy.      No acute findings in the abdomen or pelvis.      Note made of multiple compression fracture deformities of thoracic and lumbar vertebrae as described, which appear old, described on previous studies.      Electronically signed by Laura Samaniego MD      CTA HEAD NECK W CONTRAST   Final Result      Diffuse calcific plaque throughout the common carotid arteries, carotid bifurcations extending into the proximal ICA bilaterally. Findings result in moderate stenosis of the proximal ICA bilaterally. There is calcific plaque projecting within the lumen    of the left mid CCA, resulting in critical mid CCA stenosis..      Patent vertebral arteries bilaterally. Dense calcification at the origin of the right vertebral artery obscuring the vertebral artery lumen, and degree of stenosis cannot be determined.         PROCEDURE: CT ANGIOGRAPHY HEAD WITH/WITHOUT CONTRAST      INDICATION: stroke alert/fall/R sided weakness     MD Danette      CT CERVICAL SPINE WO CONTRAST   Final Result      Accentuated cervical lordosis.      Old appearing mild anterior compression deformity of the C6 vertebra.      No acute findings in the cervical spine.      Electronically signed by Laura Samaniego MD      CT HEAD WO CONTRAST   Final Result      Cerebral atrophy.      Evidence of diffuse chronic small vessel white matter disease bilaterally.      No acute intracranial findings.      Electronically signed by Laura Samaniego MD        Abnormal labs:   Abnormal Labs Reviewed   CBC WITH AUTO DIFFERENTIAL - Abnormal; Notable for the following components:       Result Value    WBC 12.1 (*)     RBC 4.05 (*)     Hemoglobin 12.6 (*)     Hematocrit 37.5 (*)     MCH 31.1 (*)     Segs Relative 85.0 (*)     Lymphocytes % 8.0 (*)     Monocytes % 6.3 (*)     All other components within normal limits   COMPREHENSIVE METABOLIC PANEL - Abnormal; Notable for the following components:    Sodium 133 (*)     Chloride 96 (*)     Glucose 146 (*)     Creatinine 0.7 (*)     Total Protein 6.1 (*)     All other components within normal limits   PROTIME/INR & PTT - Abnormal; Notable for the following components:    Protime 15.4 (*)     All other components within normal limits   BRAIN NATRIURETIC PEPTIDE - Abnormal; Notable for the following components:    Pro-BNP 1,047 (*)     All other components within normal limits   LACTIC ACID - Abnormal; Notable for the following components:    Lactate 2.1 (*)     All other components within normal limits   LIPASE - Abnormal; Notable for the following components:    Lipase 9 (*)     All other components within normal limits   BLOOD GAS, VENOUS - Abnormal; Notable for the following components:    pO2, Davis 61 (*)     O2 Sat, Davis 86.6 (*)     All other components within normal limits       Background  History:   Past Medical History:   Diagnosis Date    Acid reflux     Acute on chronic combined systolic and diastolic heart failure (HCC)

## 2024-03-10 NOTE — H&P
Wears glasses     [place] teeth extracted     4 [place] Teeth Extracted In Past     PSHX:  has a past surgical history that includes sinus surgery (1970's); eye surgery (2017); [place] tooth extraction; Cholecystectomy, laparoscopic (2011); Cardiac surgery (12/22/2015); Exploration of undescended testicle (Bilateral, 1955); Appendectomy (1958); and other surgical history (01/17/2018).  Allergies:   Allergies   Allergen Reactions    Peanuts [Peanut Oil] Swelling     Fam HX:  family history includes Arthritis in his sister; Hearing Loss in his sister; Heart Attack in his mother; Heart Disease in his father and mother; Vision Loss in his mother and sister.  Soc HX:   Social History     Socioeconomic History    Marital status: Single     Spouse name: None    Number of children: None    Years of education: None    Highest education level: None   Tobacco Use    Smoking status: Never    Smokeless tobacco: Never   Substance and Sexual Activity    Alcohol use: No    Drug use: No    Sexual activity: Never       Medications:   Medications:    Infusions:   PRN Meds:     Labs      CBC:   Recent Labs     03/10/24  1535   WBC 12.1*   HGB 12.6*        BMP:    Recent Labs     03/10/24  1535   *   K 4.4   CL 96*   CO2 25   BUN 15   CREATININE 0.7*   GLUCOSE 146*     Hepatic:   Recent Labs     03/10/24  1535   AST 20   ALT 13   BILITOT 0.6   ALKPHOS 75     Lipids:   Lab Results   Component Value Date/Time    CHOL 242 05/05/2021 09:20 AM    HDL 48 05/05/2021 09:20 AM    TRIG 188 05/05/2021 09:20 AM     Hemoglobin A1C:   Lab Results   Component Value Date/Time    LABA1C 5.4 12/19/2015 12:45 PM     TSH: No results found for: \"TSH\"  Troponin:   Lab Results   Component Value Date/Time    TROPONINT <0.010 08/09/2016 01:40 PM    TROPONINT <0.010 12/19/2015 03:15 AM    TROPONINT <0.010 12/18/2015 08:31 PM     Lactic Acid: No results for input(s): \"LACTA\" in the last 72 hours.  BNP:   Recent Labs     03/10/24  1535   PROBNP 1,047*  Contrast: 75 cc Isovue-370 Oral Contrast: None. CT CHEST: LUNGS AND AIRWAYS: The tracheobronchial tree is patent. Posterior dependent subsegmental atelectasis is seen in the bilateral lower lobes, and anterior left upper lobe with associated volume loss.  No focal areas of airspace consolidation are seen.. PLEURA: No pleural effusions or significant pleural thickening. HEART AND GREAT VESSELS: Heart size is normal.  No pericardial effusions. Coronary artery calcifications are seen. Thoracic aorta is normal in caliber. No evidence of aneurysm or dissection. 3 vessel aortic arch anatomy demonstrated. There is diffuse calcification in the aortic arch. ADENOPATHY: No significant hilar, mediastinal or axillary adenopathy is seen.. CHEST WALL / LOWER NECK: No significant abnormality. BONES: Accentuated mid to lower thoracic kyphosis. Multiple compression fracture deformities are seen of the T5, T6, T8-T10 and T12 vertebra. These appear chronic, described on prior studies dating to 2012. No significant retropulsion of bone or compromise of the central canal.. CT ABDOMEN AND PELVIS: LIVER: Homogeneous, normal in attenuation.. GALLBLADDER AND BILIARY DUCTS: Status post cholecystectomy.  No intra- or extrahepatic biliary dilatation. PANCREAS: Normal. SPLEEN: Normal. ADRENAL GLANDS: Normal. KIDNEYS AND URETERS: Symmetrical function. No evidence of renal or ureteral obstruction bilaterally. URINARY BLADDER: Mildly distended. REPRODUCTIVE ORGANS: No associated masses. BOWEL: Normal diameter, nonobstructed.  The appendix not visualized. No acute inflammatory changes are seen. Mild distal colonic diverticulosis. LYMPH NODES: No abnormally enlarged nodes. PERITONEUM / RETROPERITONEUM: No ascites or free air. VESSELS: Aorta is normal in caliber. No evidence of aneurysm or dissection.. Celiac axis, SMA, SHAYLA and renal arteries are patent. ABDOMINAL WALL: Unremarkable. BONES: Chronic compression fracture deformity of L3, with moderate

## 2024-03-10 NOTE — PROGRESS NOTES
4 Eyes Skin Assessment     NAME:  Eyal Anguiano  YOB: 1945  MEDICAL RECORD NUMBER:  [id number]    The patient is being assessed for  Admission    I agree that at least one RN has performed a thorough Head to Toe Skin Assessment on the patient. ALL assessment sites listed below have been assessed.      Areas assessed by both nurses:    Head, Face, Ears, Shoulders, Back, Chest, Arms, Elbows, Hands, Sacrum. Buttock, Coccyx, Ischium, Legs. Feet and Heels, and Under Medical Devices         Does the Patient have a Wound? No noted wound(s)       Stuart Prevention initiated by RN: No  Wound Care Orders initiated by RN: No    Pressure Injury (Stage 3,4, Unstageable, DTI, NWPT, and Complex wounds) if present, place Wound referral order by RN under : No    New Ostomies, if present place, Ostomy referral order under : No     Nurse 1 eSignature: Electronically signed by Emily Valerio LPN on 3/10/24 at 6:21 PM EDT    **SHARE this note so that the co-signing nurse can place an eSignature**    Nurse 2 eSignature: Electronically signed by Cecilia Campo RN on 3/10/24 at 6:48 PM EDT

## 2024-03-10 NOTE — ED TRIAGE NOTES
Patient to trauma 4 via EMS. Stroke alert initiated pre-hospital. Patient taken straight to CT scan via EMS. Patient was found down PTA. Patient is A & O x 4 on arrival to ED.  Patient has right sided weakness that is new. Patients initial LKW is 0800 but amended to 1200 today as patient sent a text at that time. Resps even and unlabored.

## 2024-03-10 NOTE — ED PROVIDER NOTES
QRS axis  T wave inversion now evident in Anterior leads          Radiographs (if obtained):  [] The following radiograph was interpreted by myself in the absence of a radiologist:  [x] Radiologist's Report Reviewed:    CT Brain, C spine, CTA CAP    EKG (if obtained): (All EKG's are interpreted by myself in the absence of a cardiologist)    12 lead EKG per my interpretation:  Normal Sinus Rhythm 77 RBBB  Axis is   Normal  QTc is   518  There is specific T wave changes appreciated.  Inverted T wave lead III, V1  There is no specific ST wave changes appreciated.    Prior EKG to compare with was available and mostly similar to previous but has new inverted T waves in lead V1, lead III compared to EKG of June 2023    t-PA GIVEN with the following INCLUSION CRITERIA verified (only those checked):  [] Age 18 years or older  [] Clinical diagnosis of ischemic stroke causing measurable neurological deficit  [] A patient or family members who understand the potential risks and benefits:   Of every 100 patients treated with tPA:   65 will have the same outcome   32 will have a better outcome   3 will have a worse outcome (with 1 being severely disabled or fatal) due to t-PA    t-PA NOT given due to the following EXCLUSION CRITERIA:  [] Administration of t-PA can not be initiated within 4.5 hours of onset of symptoms  [] Evidence of intracranial hemorrhage on pretreatment CT  [] Clinical presentation suggestive of subarachnoid hemorrhage (even with normal CT)  [] CT shows multilobar infarction (hypodensity of > 1/3 cerebral hemisphere)  [] Known intracranial neoplasm, arteriovenous malformation or aneurysm  [] Significant head trauma (with sustained loss of consciousness), intracranial, or  intraspinal surgery within past 3 months  [] *Blood pressure elevated (systolic > 185 mm Hg or diastolic > 110 mm Hg)   [] *Abnormal blood glucose (< 50 or > 400mg/dL)  [] Active internal bleeding  [] Known bleeding risk (including but not  limited to below)   Heparin, argatroban, or bivalirudin received within 48 hours with     PTT > upper limit of normal   Platelet count < 100,000/mm3   Current or recent use of anticoagulants including but not limited to:    Warfarin (Coumadin®) within 5 days or INR > 1.7     Apixiban (Eliquis®) within 48 hours**     Dabigatran (Pradaxa®) within 72 hours**     Enoxaparin (Lovenox®) within 24 hours**     Fondaparinux (Arixtra®) within 72 hours**     Rivaroxaban (Xarelto®) within 24 hours**     **For patients with normal renal function. Activity may be significantly   prolonged in the elderly or patients with renal dysfunction    *Remains thrombolytic eligible if BP/BG corrected while in treatment window    t-PA NOT given due to consideration of the following RELATIVE CONTRAINDICATIONS:  [] Prior ischemic stroke within last 3 months  [] Recent history of intracranial hemorrhage  [] Pregnancy  [] Current or recent use of: Prasugrel (Effient®) within last 7 days or Ticagrelor (Brilinta®) within last 5 days  [] Major surgery or serious trauma within last 14 days  [] Arterial puncture at non-compressible site or lumbar puncture within last 7 days    Note: Patient undergoing LP within last 24 hours may be at higher risk. Assess  for signs of traumatic or repeated punctures.  [] Gastrointestinal or urinary tract hemorrhage within last 21 days  [] Myocardial infarction involving left anterior myocardium within last 3 months  [] Suspected or known infective endocarditis or pericarditis        TPA NOT given due to consideration of the following RELATIVE CONTRAINDICATIONS in those patients with last known well within 3-4.5 hours:      []Taking an oral anticoagulant (apixaban, dabigatran, edoxaban, rivaroxaban) other than warfarin regardless of time since last ose. Note: This does not include warfarn. A patient on warfarn remains eligible if INR less than or equal to 1.7      MDM:    Patient brought in by EMS from home with

## 2024-03-11 ENCOUNTER — APPOINTMENT (OUTPATIENT)
Dept: NON INVASIVE DIAGNOSTICS | Age: 79
DRG: 065 | End: 2024-03-11
Attending: INTERNAL MEDICINE
Payer: MEDICARE

## 2024-03-11 ENCOUNTER — APPOINTMENT (OUTPATIENT)
Dept: MRI IMAGING | Age: 79
DRG: 065 | End: 2024-03-11
Payer: MEDICARE

## 2024-03-11 ENCOUNTER — APPOINTMENT (OUTPATIENT)
Dept: INTERVENTIONAL RADIOLOGY/VASCULAR | Age: 79
DRG: 065 | End: 2024-03-11
Payer: MEDICARE

## 2024-03-11 ENCOUNTER — TELEPHONE (OUTPATIENT)
Age: 79
End: 2024-03-11

## 2024-03-11 LAB
ACTIVATED CLOTTING TIME, LOW RANGE: 329 SEC
ANION GAP SERPL CALCULATED.3IONS-SCNC: 14 MMOL/L (ref 7–16)
ANTI-XA UNFRAC HEPARIN: <0.1 IU/ML (ref 0.3–0.7)
APTT: 35.7 SECONDS (ref 25.1–37.1)
BASOPHILS ABSOLUTE: 0 K/CU MM
BASOPHILS RELATIVE PERCENT: 0.3 % (ref 0–1)
BUN SERPL-MCNC: 20 MG/DL (ref 6–23)
CALCIUM SERPL-MCNC: 8.8 MG/DL (ref 8.3–10.6)
CHLORIDE BLD-SCNC: 100 MMOL/L (ref 99–110)
CHOLEST SERPL-MCNC: 253 MG/DL
CO2: 24 MMOL/L (ref 21–32)
CREAT SERPL-MCNC: 1.1 MG/DL (ref 0.9–1.3)
DIFFERENTIAL TYPE: ABNORMAL
EOSINOPHILS ABSOLUTE: 0 K/CU MM
EOSINOPHILS RELATIVE PERCENT: 0.3 % (ref 0–3)
GFR SERPL CREATININE-BSD FRML MDRD: >60 ML/MIN/1.73M2
GLUCOSE SERPL-MCNC: 123 MG/DL (ref 70–99)
HCT VFR BLD CALC: 41.5 % (ref 42–52)
HCT VFR BLD CALC: 46 % (ref 42–52)
HDLC SERPL-MCNC: 43 MG/DL
HEMOGLOBIN: 13.5 GM/DL (ref 13.5–18)
HEMOGLOBIN: 14.6 GM/DL (ref 13.5–18)
IMMATURE NEUTROPHIL %: 0.3 % (ref 0–0.43)
INR BLD: 1.1 INDEX
LDLC SERPL CALC-MCNC: 181 MG/DL
LYMPHOCYTES ABSOLUTE: 1.6 K/CU MM
LYMPHOCYTES RELATIVE PERCENT: 13.5 % (ref 24–44)
MCH RBC QN AUTO: 30.8 PG (ref 27–31)
MCH RBC QN AUTO: 30.9 PG (ref 27–31)
MCHC RBC AUTO-ENTMCNC: 31.7 % (ref 32–36)
MCHC RBC AUTO-ENTMCNC: 32.5 % (ref 32–36)
MCV RBC AUTO: 94.7 FL (ref 78–100)
MCV RBC AUTO: 97.5 FL (ref 78–100)
MONOCYTES ABSOLUTE: 1.1 K/CU MM
MONOCYTES RELATIVE PERCENT: 9.7 % (ref 0–4)
NUCLEATED RBC %: 0 %
PDW BLD-RTO: 11.9 % (ref 11.7–14.9)
PDW BLD-RTO: 11.9 % (ref 11.7–14.9)
PLATELET # BLD: 289 K/CU MM (ref 140–440)
PLATELET # BLD: 292 K/CU MM (ref 140–440)
PMV BLD AUTO: 8.9 FL (ref 7.5–11.1)
PMV BLD AUTO: 9 FL (ref 7.5–11.1)
POTASSIUM SERPL-SCNC: 3.8 MMOL/L (ref 3.5–5.1)
PROTHROMBIN TIME: 14.9 SECONDS (ref 11.7–14.5)
RBC # BLD: 4.38 M/CU MM (ref 4.6–6.2)
RBC # BLD: 4.72 M/CU MM (ref 4.6–6.2)
SEGMENTED NEUTROPHILS ABSOLUTE COUNT: 8.9 K/CU MM
SEGMENTED NEUTROPHILS RELATIVE PERCENT: 75.9 % (ref 36–66)
SODIUM BLD-SCNC: 138 MMOL/L (ref 135–145)
TOTAL IMMATURE NEUTOROPHIL: 0.04 K/CU MM
TOTAL NUCLEATED RBC: 0 K/CU MM
TRIGL SERPL-MCNC: 143 MG/DL
WBC # BLD: 11.8 K/CU MM (ref 4–10.5)
WBC # BLD: 13.4 K/CU MM (ref 4–10.5)

## 2024-03-11 PROCEDURE — 80048 BASIC METABOLIC PNL TOTAL CA: CPT

## 2024-03-11 PROCEDURE — 85730 THROMBOPLASTIN TIME PARTIAL: CPT

## 2024-03-11 PROCEDURE — 6360000004 HC RX CONTRAST MEDICATION

## 2024-03-11 PROCEDURE — 97166 OT EVAL MOD COMPLEX 45 MIN: CPT

## 2024-03-11 PROCEDURE — 36223 PLACE CATH CAROTID/INOM ART: CPT

## 2024-03-11 PROCEDURE — 70551 MRI BRAIN STEM W/O DYE: CPT

## 2024-03-11 PROCEDURE — G0378 HOSPITAL OBSERVATION PER HR: HCPCS

## 2024-03-11 PROCEDURE — 2580000003 HC RX 258: Performed by: INTERNAL MEDICINE

## 2024-03-11 PROCEDURE — 99152 MOD SED SAME PHYS/QHP 5/>YRS: CPT

## 2024-03-11 PROCEDURE — 97530 THERAPEUTIC ACTIVITIES: CPT

## 2024-03-11 PROCEDURE — 96376 TX/PRO/DX INJ SAME DRUG ADON: CPT

## 2024-03-11 PROCEDURE — B3121ZZ FLUOROSCOPY OF LEFT SUBCLAVIAN ARTERY USING LOW OSMOLAR CONTRAST: ICD-10-PCS | Performed by: PSYCHIATRY & NEUROLOGY

## 2024-03-11 PROCEDURE — B3171ZZ FLUOROSCOPY OF LEFT INTERNAL CAROTID ARTERY USING LOW OSMOLAR CONTRAST: ICD-10-PCS | Performed by: PSYCHIATRY & NEUROLOGY

## 2024-03-11 PROCEDURE — 99222 1ST HOSP IP/OBS MODERATE 55: CPT | Performed by: NURSE PRACTITIONER

## 2024-03-11 PROCEDURE — 80061 LIPID PANEL: CPT

## 2024-03-11 PROCEDURE — 85025 COMPLETE CBC W/AUTO DIFF WBC: CPT

## 2024-03-11 PROCEDURE — 85027 COMPLETE CBC AUTOMATED: CPT

## 2024-03-11 PROCEDURE — 85347 COAGULATION TIME ACTIVATED: CPT

## 2024-03-11 PROCEDURE — B3111ZZ FLUOROSCOPY OF RIGHT BRACHIOCEPHALIC-SUBCLAVIAN ARTERY USING LOW OSMOLAR CONTRAST: ICD-10-PCS | Performed by: PSYCHIATRY & NEUROLOGY

## 2024-03-11 PROCEDURE — B31G1ZZ FLUOROSCOPY OF BILATERAL VERTEBRAL ARTERIES USING LOW OSMOLAR CONTRAST: ICD-10-PCS | Performed by: PSYCHIATRY & NEUROLOGY

## 2024-03-11 PROCEDURE — 6370000000 HC RX 637 (ALT 250 FOR IP): Performed by: PSYCHIATRY & NEUROLOGY

## 2024-03-11 PROCEDURE — 99223 1ST HOSP IP/OBS HIGH 75: CPT | Performed by: NURSE PRACTITIONER

## 2024-03-11 PROCEDURE — 36415 COLL VENOUS BLD VENIPUNCTURE: CPT

## 2024-03-11 PROCEDURE — 83036 HEMOGLOBIN GLYCOSYLATED A1C: CPT

## 2024-03-11 PROCEDURE — 2140000000 HC CCU INTERMEDIATE R&B

## 2024-03-11 PROCEDURE — 2500000003 HC RX 250 WO HCPCS

## 2024-03-11 PROCEDURE — 99153 MOD SED SAME PHYS/QHP EA: CPT

## 2024-03-11 PROCEDURE — 6360000002 HC RX W HCPCS

## 2024-03-11 PROCEDURE — B3151ZZ FLUOROSCOPY OF BILATERAL COMMON CAROTID ARTERIES USING LOW OSMOLAR CONTRAST: ICD-10-PCS | Performed by: PSYCHIATRY & NEUROLOGY

## 2024-03-11 PROCEDURE — 6370000000 HC RX 637 (ALT 250 FOR IP): Performed by: NURSE PRACTITIONER

## 2024-03-11 PROCEDURE — B31B1ZZ FLUOROSCOPY OF LEFT EXTERNAL CAROTID ARTERY USING LOW OSMOLAR CONTRAST: ICD-10-PCS | Performed by: PSYCHIATRY & NEUROLOGY

## 2024-03-11 PROCEDURE — 36225 PLACE CATH SUBCLAVIAN ART: CPT

## 2024-03-11 PROCEDURE — 85610 PROTHROMBIN TIME: CPT

## 2024-03-11 PROCEDURE — 85520 HEPARIN ASSAY: CPT

## 2024-03-11 PROCEDURE — 2500000003 HC RX 250 WO HCPCS: Performed by: STUDENT IN AN ORGANIZED HEALTH CARE EDUCATION/TRAINING PROGRAM

## 2024-03-11 PROCEDURE — 96374 THER/PROPH/DIAG INJ IV PUSH: CPT

## 2024-03-11 PROCEDURE — 2709999900 IR ANGIOGRAM CAROTID CEREBRAL BILATERAL

## 2024-03-11 PROCEDURE — 94761 N-INVAS EAR/PLS OXIMETRY MLT: CPT

## 2024-03-11 PROCEDURE — 97162 PT EVAL MOD COMPLEX 30 MIN: CPT

## 2024-03-11 PROCEDURE — 2580000003 HC RX 258: Performed by: NURSE PRACTITIONER

## 2024-03-11 PROCEDURE — 6360000002 HC RX W HCPCS: Performed by: PSYCHIATRY & NEUROLOGY

## 2024-03-11 RX ORDER — CLOPIDOGREL BISULFATE 75 MG/1
75 TABLET ORAL DAILY
Status: DISCONTINUED | OUTPATIENT
Start: 2024-03-12 | End: 2024-03-11

## 2024-03-11 RX ORDER — CLOPIDOGREL BISULFATE 75 MG/1
300 TABLET ORAL ONCE
Status: COMPLETED | OUTPATIENT
Start: 2024-03-11 | End: 2024-03-11

## 2024-03-11 RX ORDER — FENTANYL CITRATE 50 UG/ML
INJECTION, SOLUTION INTRAMUSCULAR; INTRAVENOUS PRN
Status: COMPLETED | OUTPATIENT
Start: 2024-03-11 | End: 2024-03-11

## 2024-03-11 RX ORDER — ASPIRIN 300 MG/1
300 SUPPOSITORY RECTAL ONCE
Status: COMPLETED | OUTPATIENT
Start: 2024-03-11 | End: 2024-03-11

## 2024-03-11 RX ORDER — HEPARIN SODIUM 10000 [USP'U]/100ML
5-30 INJECTION, SOLUTION INTRAVENOUS CONTINUOUS
Status: DISCONTINUED | OUTPATIENT
Start: 2024-03-11 | End: 2024-03-15 | Stop reason: HOSPADM

## 2024-03-11 RX ORDER — HYDRALAZINE HYDROCHLORIDE 20 MG/ML
5 INJECTION INTRAMUSCULAR; INTRAVENOUS EVERY 6 HOURS PRN
Status: DISCONTINUED | OUTPATIENT
Start: 2024-03-11 | End: 2024-03-15 | Stop reason: HOSPADM

## 2024-03-11 RX ORDER — SODIUM CHLORIDE 0.9 % (FLUSH) 0.9 %
5-40 SYRINGE (ML) INJECTION EVERY 12 HOURS SCHEDULED
Status: DISCONTINUED | OUTPATIENT
Start: 2024-03-11 | End: 2024-03-15 | Stop reason: HOSPADM

## 2024-03-11 RX ORDER — ASPIRIN 300 MG/1
300 SUPPOSITORY RECTAL ONCE
Status: DISCONTINUED | OUTPATIENT
Start: 2024-03-11 | End: 2024-03-12

## 2024-03-11 RX ORDER — SODIUM CHLORIDE 0.9 % (FLUSH) 0.9 %
5-40 SYRINGE (ML) INJECTION PRN
Status: DISCONTINUED | OUTPATIENT
Start: 2024-03-11 | End: 2024-03-15 | Stop reason: HOSPADM

## 2024-03-11 RX ORDER — HEPARIN SODIUM 1000 [USP'U]/ML
INJECTION, SOLUTION INTRAVENOUS; SUBCUTANEOUS PRN
Status: COMPLETED | OUTPATIENT
Start: 2024-03-11 | End: 2024-03-11

## 2024-03-11 RX ORDER — MIDAZOLAM HYDROCHLORIDE 2 MG/2ML
INJECTION, SOLUTION INTRAMUSCULAR; INTRAVENOUS PRN
Status: COMPLETED | OUTPATIENT
Start: 2024-03-11 | End: 2024-03-11

## 2024-03-11 RX ADMIN — SODIUM CHLORIDE, PRESERVATIVE FREE 10 ML: 5 INJECTION INTRAVENOUS at 19:57

## 2024-03-11 RX ADMIN — CLOPIDOGREL BISULFATE 300 MG: 75 TABLET ORAL at 10:39

## 2024-03-11 RX ADMIN — FENTANYL CITRATE 50 MCG: 50 INJECTION, SOLUTION INTRAMUSCULAR; INTRAVENOUS at 12:31

## 2024-03-11 RX ADMIN — SODIUM CHLORIDE, PRESERVATIVE FREE 10 ML: 5 INJECTION INTRAVENOUS at 10:41

## 2024-03-11 RX ADMIN — HEPARIN SODIUM 12 UNITS/KG/HR: 10000 INJECTION, SOLUTION INTRAVENOUS at 19:45

## 2024-03-11 RX ADMIN — HEPARIN SODIUM 5000 UNITS: 1000 INJECTION INTRAVENOUS; SUBCUTANEOUS at 13:02

## 2024-03-11 RX ADMIN — MIDAZOLAM HYDROCHLORIDE 1 MG: 1 INJECTION, SOLUTION INTRAMUSCULAR; INTRAVENOUS at 12:31

## 2024-03-11 RX ADMIN — ASPIRIN 300 MG: 300 SUPPOSITORY RECTAL at 12:56

## 2024-03-11 NOTE — PROGRESS NOTES
Normal breath sounds. No wheezing, rhonchi or rales.   Abdominal:      General: Abdomen is flat. Bowel sounds are normal. There is no distension.      Palpations: Abdomen is soft.      Tenderness: There is no abdominal tenderness.   Musculoskeletal:         General: No deformity. Normal range of motion.      Cervical back: Normal range of motion and neck supple.      Right lower leg: No edema.      Left lower leg: No edema.   Skin:     Coloration: Skin is not jaundiced or pale.   Neurological:      Mental Status: He is alert and oriented to person, place, and time. Mental status is at baseline.      Motor: Weakness present.            Medications:   Medications:    [START ON 3/12/2024] clopidogrel  75 mg Oral Daily    aspirin  300 mg Rectal Once    amiodarone  100 mg Oral Daily    aspirin  81 mg Oral QAM    atorvastatin  80 mg Oral Daily    sodium chloride flush  5-40 mL IntraVENous 2 times per day    enoxaparin  40 mg SubCUTAneous Daily    pantoprazole  40 mg Oral QAM AC      Infusions:    sodium chloride       PRN Meds: fentanNYL, , PRN  midazolam, , PRN  heparin (porcine), , PRN  sodium chloride flush, 5-40 mL, PRN  sodium chloride, , PRN  potassium chloride, 40 mEq, PRN   Or  potassium alternative oral replacement, 40 mEq, PRN   Or  potassium chloride, 10 mEq, PRN  magnesium sulfate, 2,000 mg, PRN  ondansetron, 4 mg, Q8H PRN   Or  ondansetron, 4 mg, Q6H PRN  polyethylene glycol, 17 g, Daily PRN  acetaminophen, 650 mg, Q6H PRN   Or  acetaminophen, 650 mg, Q6H PRN        Labs      Recent Results (from the past 24 hour(s))   Blood Gas, Venous    Collection Time: 03/10/24  3:15 PM   Result Value Ref Range    pH, Davis 7.39 7.32 - 7.43    pCO2, Davis 47 41 - 51 mmHG    pO2, Davis 61 (H) 28 - 48 mmHG    Base Exc, Mixed 2.8 0 - 3.0    HCO3, Venous 28.5 22 - 29 MMOL/L    O2 Sat, Davis 86.6 (H) 50 - 70 %    Comment VBG    CBC with Auto Differential    Collection Time: 03/10/24  3:35 PM   Result Value Ref Range    WBC 12.1 (H)  CCA resulting in severe, critical stenosis of the mid CCA (axial series 303, image 102). Calcific plaque at the carotid bifurcation extending into the proximal ICA, resulting in moderate, 50-69% stenosis of the proximal left ICA. NECK SOFT TISSUES: No neck soft tissue mass or lymphadenopathy. Thyroid gland normal. VISUALIZED MEDIASTINUM: No mass or lymphadenopathy. VISUALIZED LUNG APICES: Clear. BONES: No destructive osseous process.     Diffuse calcific plaque throughout the common carotid arteries, carotid bifurcations extending into the proximal ICA bilaterally. Findings result in moderate stenosis of the proximal ICA bilaterally. There is calcific plaque projecting within the lumen of the left mid CCA, resulting in critical mid CCA stenosis.. Patent vertebral arteries bilaterally. Dense calcification at the origin of the right vertebral artery obscuring the vertebral artery lumen, and degree of stenosis cannot be determined. PROCEDURE: CT ANGIOGRAPHY HEAD WITH/WITHOUT CONTRAST INDICATION: stroke alert/fall/R sided weakness COMPARISON: None. TECHNIQUE: Axial CT imaging obtained through the head prior to and following administration of IV contrast. Axial images, multiplanar reformatted images, and maximum intensity projection images were reviewed for CT angiographic technique. Up-to-date CT equipment radiation dose reduction techniques are utilized. IV contrast: 75 mL Isovue-370. FINDINGS: ANTERIOR CIRCULATION: Calcification of the carotid siphons bilaterally. There is severe narrowing of the supraclinoid, distal ICA bilaterally. Poststenotic dilatation is seen of the distal right ICA proximal to the ICA bifurcation. There is mild irregularity of the M1 segment of the left MCA. No definite major vessel stenosis is seen. Distal arteries of the left MCA are underperfused when compared with the right, likely due to lower flow state. No evidence for aneurysm or arteriovenous malformation. POSTERIOR CIRCULATION: The

## 2024-03-11 NOTE — CONSULTS
review his case and imaging in detail with JOSE Upton, and agree with assessment and plan above.  I will see and examine him formally tomorrow.    Mary Mloina, DO

## 2024-03-11 NOTE — TELEPHONE ENCOUNTER
Patient will need a follow up in stroke clinic when discharged with Delaney SHUKLA per notes 3/13/24.

## 2024-03-11 NOTE — PROGRESS NOTES
TRANSFER - OUT REPORT:    Verbal report given to BIANCA Tubbs on Eyal Anguiano being transferred to IR [facility] for routine progression of patient care       Report consisted of patient's Situation, Background, Assessment and   Recommendations(SBAR).     Information from the following report(s) Nurse Handoff Report was reviewed with the receiving nurse.    Opportunity for questions and clarification was provided.      Patient transported with:   Registered Nurse

## 2024-03-11 NOTE — OR NURSING
PROCEDURE START: 1258  PROCEDURE END: 1335  FLUORO TIME: 10.8  AK: 662   DAP: 214  CONTRAST VOLUME: 160 cc    ARRHYTHMIA:  INTERVENTION:

## 2024-03-11 NOTE — PROGRESS NOTES
Procedural report given to Liza RN on 3N.  Vitals remained stable.  Right femoral groin site WNL.  Called Yaniv in pt's contact list per pt request and voicemail left regarding change in room.  See orders.

## 2024-03-11 NOTE — PROGRESS NOTES
Discussed with the RN team and ultimately with the Neurology team as I was informed as the patient is supposed to be strict NPO now. After discussion with the neurology team, we have started the patient of the hep and held all the medication for the night until seen by speech therapy team evaluated in the morning

## 2024-03-11 NOTE — PROGRESS NOTES
[facility]  DEPARTMENT OF SPEECH/LANGUAGE PATHOLOGY    Eyal Anguiano  3/11/2024  [id number]    Attempted to see Eyal Anguiano for dysphagia evaluation. Pt NPO awaiting angiogram. Will reattempt as appropriate.    Debbi Jones, SLP  3/11/2024  10:39 AM

## 2024-03-11 NOTE — CONSULTS
[facility] ACUTE CARE PHYSICAL THERAPY EVALUATION  Eyal Anguiano, 1945, 3028/3028-A, 3/11/2024    History  [place]:  The primary encounter diagnosis was Stroke-like symptoms. Diagnoses of Fall, initial encounter, Right sided weakness, and Acute electrocardiogram changes were also pertinent to this visit.  Patient  has a past medical history of Acid reflux, Acute on chronic combined systolic and diastolic heart failure (HCC), CAD (coronary artery disease), CHF (congestive heart failure) (HCC), Fall, H/O cardiovascular stress test, H/O Doppler lower venous ultrasound, H/O echocardiogram, H/O percutaneous left heart catheterization, History of  24 hr Holter monitoring, History of fractured rib, [place] (hard of hearing), Hx of Doppler echocardiogram, Hyperlipidemia, Hypertension, S/P CABG x 3, Shortness of breath on exertion, Teeth missing, Vertebral compression fracture (HCC), Wears glasses, and [place] teeth extracted.  Patient  has a past surgical history that includes sinus surgery (1970's); eye surgery (2017); [place] tooth extraction; Cholecystectomy, laparoscopic (2011); Cardiac surgery (12/22/2015); Exploration of undescended testicle (Bilateral, 1955); Appendectomy (1958); and other surgical history (01/17/2018).    Discharge Recommendation: Inpatient Rehabilitation    Subjective:    Patient states:  \"what can I take to make my speech better?\"      Pain:  did not state numerical value.      Communication with other providers:  Handoff to RNDenisse, co-eval with ESME Nieves student, ESME Berg supervising for safety and tolerance    Restrictions: General Precautions, Fall Risk    Home Setup/Prior level of function  Social/Functional History  Lives With: Other (comment) (landlord)  Type of Home: House  Home Layout: One level  Home Access: Stairs to enter without rails  Entrance Stairs - Number of Steps: 3  Bathroom Shower/Tub: Tub/Shower unit  Bathroom Equipment: Grab bars in shower, Shower  rehabilitation.     Complexity: moderate    Prognosis: Good, no significant barriers to participation at this time.   General Plan: 5-7 times per week       Equipment: to be assessed at next level of care    Goals:  Short Term Goals  Time Frame for Short Term Goals: 1 week or until discharge  Short Term Goal 1: Patient will perform bed mobility with modA x1  Short Term Goal 2: Patient will perform functional transfers with FWW or LRAD and Ros  Short Term Goal 3: Patient will ambulate 10' with FWW or LRAD and Ros       Treatment plan:  Bed mobility, transfers, balance, gait, TA, TX, neuromuscular re-education    Recommendations for NURSING mobility: assist x1 for SPT    Time:   Time in: 0934  Time out: 1002  Timed treatment minutes: 9  Total time: 28    Electronically signed by:    Makayla Wiggins, PT  3/11/2024, 12:32 PM

## 2024-03-11 NOTE — PROGRESS NOTES
4 Eyes Skin Assessment     NAME:  Eyal Anguiano  YOB: 1945  MEDICAL RECORD NUMBER:  [id number]    The patient is being assessed for  Admission    I agree that at least one RN has performed a thorough Head to Toe Skin Assessment on the patient. ALL assessment sites listed below have been assessed.      Areas assessed by both nurses:    Head, Face, Ears, Shoulders, Back, Chest, Arms, Elbows, Hands, Sacrum. Buttock, Coccyx, Ischium, Legs. Feet and Heels, and Under Medical Devices         Does the Patient have a Wound? No noted wound(s)       Stuart Prevention initiated by RN: Yes  Wound Care Orders initiated by RN: No    Pressure Injury (Stage 3,4, Unstageable, DTI, NWPT, and Complex wounds) if present, place Wound referral order by RN under : No    New Ostomies, if present place, Ostomy referral order under : No     Nurse 1 eSignature: Electronically signed by Amarilys De Anda RN on 3/11/24 at 7:59 PM EDT    **SHARE this note so that the co-signing nurse can place an eSignature**    Nurse 2 eSignature: Electronically signed by Sandra Dickson RN on 3/11/24 at 9:50 PM EDT

## 2024-03-11 NOTE — CONSULTS
H/O Doppler lower venous ultrasound 04/15/2019    No DVT, Significant reflux noted in the Left Popliteal vein.    H/O echocardiogram 12/21/15    EF 55%, Mild TR MR.     H/O percutaneous left heart catheterization 12/19/15    EF 55%. Lt. main 50%, LAD 99% mid, Diag 70%. %,      History of  24 hr Holter monitoring 11/29/2016    rhythm is sinus. Pr normal, wide QRS- 2 singles ve's , 232 single SVE's, 4 paired, 2 runs of svt    History of fractured rib 08/2016    \"Fell In My [place] And Fractured 3 Ribs Right Side\"    [place] (hard of hearing)     Bilateral Hearing Aids    Hx of Doppler echocardiogram 12/04/2017    EF50-60%,normal    Hyperlipidemia     Hypertension     S/P CABG x 3 12/22/2015    SEGAL to LAD & Diag, SVG to Cx M1    Shortness of breath on exertion     Teeth missing     Upper And Lower    Vertebral compression fracture (HCC)     2014    Wears glasses     Dugger teeth extracted     4 Dugger Teeth Extracted In Past    :   Past Surgical History:   Procedure Laterality Date    APPENDECTOMY  1958    CARDIAC SURGERY  12/22/2015    CABG (3 Bypasses)    CHOLECYSTECTOMY, LAPAROSCOPIC  2011    EXPLORATION OF UNDESCENDED TESTICLE Bilateral 1955    EYE SURGERY  2017    Cataracts With Lens Implants    OTHER SURGICAL HISTORY  01/17/2018    sternal rewire with hernandez lock plating of sternum     SINUS SURGERY  1970's    WISDOM TOOTH EXTRACTION      4 Dugger Teeth Extracted In Past     Medications:  Scheduled Meds:   amiodarone  100 mg Oral Daily    aspirin  81 mg Oral QAM    atorvastatin  80 mg Oral Daily    sodium chloride flush  5-40 mL IntraVENous 2 times per day    enoxaparin  40 mg SubCUTAneous Daily    pantoprazole  40 mg Oral QAM AC     Continuous Infusions:   sodium chloride       PRN Meds:.sodium chloride flush, sodium chloride, potassium chloride **OR** potassium alternative oral replacement **OR** potassium chloride, magnesium sulfate, ondansetron **OR** ondansetron, polyethylene glycol, acetaminophen  intervention cases there is a less 5% risk of stroke or serious complication, for ischemic stroke interventions there is a 10% risk of stroke or worsening.  Other risk include but are not limited to infection, dissection, radiation injury, hemorrhage, contrast reactions, nephrotoxicity and death.              Thank you for allowing us to participate in the care of your patient.  If there are any questions regarding evaluation please feel free to contact us.     Delaney Unger, APRN - CNP, 3/11/2024

## 2024-03-11 NOTE — PROGRESS NOTES
Occupational Therapy  [facility] ACUTE CARE OCCUPATIONAL THERAPY EVALUATION    Eyal Anguiano, 1945, 3028/3028-A, 3/11/2024    Discharge Recommendation: Inpatient Rehabilitation    History:  [place]:  The primary encounter diagnosis was Stroke-like symptoms. Diagnoses of Fall, initial encounter, Right sided weakness, and Acute electrocardiogram changes were also pertinent to this visit.    Subjective:  Patient states: \"I'm very hard of hearing, you'll have to speak up.\"  Pain: Pt denied pain this date  Communication with other providers: PT Makayla, RN Denisse   Restrictions: General Precautions, Fall Risk, Telemetry, BP Cuff, Pulse Ox, Bed/Chair Alarm    Home Setup/Prior level of function:  Social/Functional History  Lives With: Other (comment) (landlord)  Type of Home: House  Home Layout: One level  Home Access: Stairs to enter without rails  Entrance Stairs - Number of Steps: 3  Bathroom Shower/Tub: Tub/Shower unit  Bathroom Equipment: Grab bars in shower, Shower chair  Home Equipment: Walker, rolling  Has the patient had two or more falls in the past year or any fall with injury in the past year?: Yes (3-4 falls since onset of symptoms)  ADL Assistance: Independent  Homemaking Assistance: Independent  Homemaking Responsibilities: Yes  Ambulation Assistance: Independent (typically uses no AD)  Transfer Assistance: Independent  Active : Yes  Occupation: Retired    Examination:  Observation: Supine in bed upon arrival. Pleasant and agreeable to OT evaluation  Vision: WFL   Hearing: Very [place]  Vitals: Stable vitals throughout session on room air    Body Systems and functions:  ROM: WFL in all joints Lt UE, Active ROM in Rt UE limited in gravity-resisted plane, Passive ROM and gravity-eliminated active ROM WFL in all joints Rt UE  Strength: Grossly 4/5 in all major muscle groups Lt UE, grossly 2/5 in all major muscle groups Rt UE   Sensation: WFL  Tone: Hypotonic in Rt UE  Coordination: Fine  HH distance to bathroom for toileting min A with LRAD   6. Pt will complete all aspects of toileting task mod A with use of grab bars PRN  7. Pt will complete oral hygiene/grooming routine in standing at sink min A   8. Pt will complete ther ex/ther act with focus on Rt UE ROM/strengthening and OOB activity tolerance >4 minutes with no seated rest break    Time:   Time in: 934  Time out: 1002  Timed treatment minutes: 13  Total time: 28    Electronically signed by:    ALONSO Mckeon/OT    \"I, the qualified professional, was present and in the room for the entire session. The student participates in the delivery of services when the qualified practitioner is directing the service, making the skilled judgment, and is responsible for the assessment and treatment.\"     Otis Francisco OTR/L, MOT, [id number]

## 2024-03-12 ENCOUNTER — APPOINTMENT (OUTPATIENT)
Dept: NON INVASIVE DIAGNOSTICS | Age: 79
DRG: 065 | End: 2024-03-12
Attending: INTERNAL MEDICINE
Payer: MEDICARE

## 2024-03-12 ENCOUNTER — APPOINTMENT (OUTPATIENT)
Dept: GENERAL RADIOLOGY | Age: 79
DRG: 065 | End: 2024-03-12
Payer: MEDICARE

## 2024-03-12 ENCOUNTER — APPOINTMENT (OUTPATIENT)
Dept: CT IMAGING | Age: 79
DRG: 065 | End: 2024-03-12
Payer: MEDICARE

## 2024-03-12 ENCOUNTER — APPOINTMENT (OUTPATIENT)
Dept: GENERAL RADIOLOGY | Age: 79
DRG: 065 | End: 2024-03-12
Attending: STUDENT IN AN ORGANIZED HEALTH CARE EDUCATION/TRAINING PROGRAM
Payer: MEDICARE

## 2024-03-12 PROBLEM — I63.9 ACUTE CEREBROVASCULAR ACCIDENT (CVA) (HCC): Status: ACTIVE | Noted: 2024-03-12

## 2024-03-12 LAB
ANION GAP SERPL CALCULATED.3IONS-SCNC: 17 MMOL/L (ref 7–16)
ANTI-XA UNFRAC HEPARIN: 0.25 IU/ML (ref 0.3–0.7)
ANTI-XA UNFRAC HEPARIN: 0.53 IU/ML (ref 0.3–0.7)
ANTI-XA UNFRAC HEPARIN: 0.56 IU/ML (ref 0.3–0.7)
BUN SERPL-MCNC: 38 MG/DL (ref 6–23)
CALCIUM SERPL-MCNC: 9.4 MG/DL (ref 8.3–10.6)
CHLORIDE BLD-SCNC: 98 MMOL/L (ref 99–110)
CO2: 22 MMOL/L (ref 21–32)
CREAT SERPL-MCNC: 1.5 MG/DL (ref 0.9–1.3)
ECHO AO ROOT DIAM: 3.7 CM
ECHO AO ROOT INDEX: 1.86 CM/M2
ECHO AV AREA PEAK VELOCITY: 2.4 CM2
ECHO AV AREA VTI: 2.5 CM2
ECHO AV AREA/BSA PEAK VELOCITY: 1.2 CM2/M2
ECHO AV AREA/BSA VTI: 1.3 CM2/M2
ECHO AV MEAN GRADIENT: 2 MMHG
ECHO AV MEAN VELOCITY: 0.7 M/S
ECHO AV PEAK GRADIENT: 4 MMHG
ECHO AV PEAK VELOCITY: 1 M/S
ECHO AV VELOCITY RATIO: 0.6
ECHO AV VTI: 16.9 CM
ECHO BSA: 1.96 M2
ECHO LA AREA 4C: 20.9 CM2
ECHO LA DIAMETER INDEX: 1.41 CM/M2
ECHO LA DIAMETER: 2.8 CM
ECHO LA MAJOR AXIS: 6 CM
ECHO LA TO AORTIC ROOT RATIO: 0.76
ECHO LA VOL MOD A4C: 63 ML (ref 18–58)
ECHO LA VOLUME INDEX MOD A4C: 32 ML/M2 (ref 16–34)
ECHO LV E' LATERAL VELOCITY: 8 CM/S
ECHO LV E' SEPTAL VELOCITY: 4 CM/S
ECHO LV EDV A4C: 94 ML
ECHO LV EDV INDEX A4C: 47 ML/M2
ECHO LV EJECTION FRACTION A4C: 52 %
ECHO LV ESV A4C: 45 ML
ECHO LV ESV INDEX A4C: 23 ML/M2
ECHO LV FRACTIONAL SHORTENING: 33 % (ref 28–44)
ECHO LV INTERNAL DIMENSION DIASTOLE INDEX: 1.66 CM/M2
ECHO LV INTERNAL DIMENSION DIASTOLIC: 3.3 CM (ref 4.2–5.9)
ECHO LV INTERNAL DIMENSION SYSTOLIC INDEX: 1.11 CM/M2
ECHO LV INTERNAL DIMENSION SYSTOLIC: 2.2 CM
ECHO LV IVSD: 1 CM (ref 0.6–1)
ECHO LV MASS 2D: 101.7 G (ref 88–224)
ECHO LV MASS INDEX 2D: 51.1 G/M2 (ref 49–115)
ECHO LV POSTERIOR WALL DIASTOLIC: 1.1 CM (ref 0.6–1)
ECHO LV RELATIVE WALL THICKNESS RATIO: 0.67
ECHO LVOT AREA: 4.2 CM2
ECHO LVOT AV VTI INDEX: 0.6
ECHO LVOT DIAM: 2.3 CM
ECHO LVOT MEAN GRADIENT: 1 MMHG
ECHO LVOT PEAK GRADIENT: 1 MMHG
ECHO LVOT PEAK VELOCITY: 0.6 M/S
ECHO LVOT STROKE VOLUME INDEX: 21.1 ML/M2
ECHO LVOT SV: 41.9 ML
ECHO LVOT VTI: 10.1 CM
ECHO MV A VELOCITY: 0.8 M/S
ECHO MV E DECELERATION TIME (DT): 218 MS
ECHO MV E VELOCITY: 0.58 M/S
ECHO MV E/A RATIO: 0.73
ECHO MV E/E' LATERAL: 7.25
ECHO MV E/E' RATIO (AVERAGED): 10.88
ECHO RV MID DIMENSION: 3.1 CM
EKG ATRIAL RATE: 77 BPM
EKG DIAGNOSIS: NORMAL
EKG P AXIS: 14 DEGREES
EKG P-R INTERVAL: 170 MS
EKG Q-T INTERVAL: 458 MS
EKG QRS DURATION: 142 MS
EKG QTC CALCULATION (BAZETT): 518 MS
EKG R AXIS: 8 DEGREES
EKG T AXIS: -7 DEGREES
EKG VENTRICULAR RATE: 77 BPM
GFR SERPL CREATININE-BSD FRML MDRD: 47 ML/MIN/1.73M2
GLUCOSE BLD-MCNC: 174 MG/DL (ref 70–99)
GLUCOSE SERPL-MCNC: 147 MG/DL (ref 70–99)
HCT VFR BLD CALC: 40.8 % (ref 42–52)
HEMOGLOBIN: 13.4 GM/DL (ref 13.5–18)
LACTATE: 3.1 MMOL/L (ref 0.5–1.9)
MAGNESIUM: 2.5 MG/DL (ref 1.8–2.4)
MCH RBC QN AUTO: 32.1 PG (ref 27–31)
MCHC RBC AUTO-ENTMCNC: 32.8 % (ref 32–36)
MCV RBC AUTO: 97.6 FL (ref 78–100)
PDW BLD-RTO: 12.1 % (ref 11.7–14.9)
PHOSPHORUS: 4.8 MG/DL (ref 2.5–4.9)
PLATELET # BLD: 265 K/CU MM (ref 140–440)
PMV BLD AUTO: 9.8 FL (ref 7.5–11.1)
POTASSIUM SERPL-SCNC: 3.9 MMOL/L (ref 3.5–5.1)
RBC # BLD: 4.18 M/CU MM (ref 4.6–6.2)
SODIUM BLD-SCNC: 137 MMOL/L (ref 135–145)
TROPONIN, HIGH SENSITIVITY: 22 NG/L (ref 0–22)
WBC # BLD: 16.8 K/CU MM (ref 4–10.5)

## 2024-03-12 PROCEDURE — 93306 TTE W/DOPPLER COMPLETE: CPT

## 2024-03-12 PROCEDURE — 70450 CT HEAD/BRAIN W/O DYE: CPT

## 2024-03-12 PROCEDURE — 74018 RADEX ABDOMEN 1 VIEW: CPT

## 2024-03-12 PROCEDURE — 93306 TTE W/DOPPLER COMPLETE: CPT | Performed by: INTERNAL MEDICINE

## 2024-03-12 PROCEDURE — 80048 BASIC METABOLIC PNL TOTAL CA: CPT

## 2024-03-12 PROCEDURE — 85027 COMPLETE CBC AUTOMATED: CPT

## 2024-03-12 PROCEDURE — 99233 SBSQ HOSP IP/OBS HIGH 50: CPT | Performed by: STUDENT IN AN ORGANIZED HEALTH CARE EDUCATION/TRAINING PROGRAM

## 2024-03-12 PROCEDURE — 82962 GLUCOSE BLOOD TEST: CPT

## 2024-03-12 PROCEDURE — 2500000003 HC RX 250 WO HCPCS: Performed by: STUDENT IN AN ORGANIZED HEALTH CARE EDUCATION/TRAINING PROGRAM

## 2024-03-12 PROCEDURE — 1200000000 HC SEMI PRIVATE

## 2024-03-12 PROCEDURE — 97530 THERAPEUTIC ACTIVITIES: CPT

## 2024-03-12 PROCEDURE — G0378 HOSPITAL OBSERVATION PER HR: HCPCS

## 2024-03-12 PROCEDURE — 36415 COLL VENOUS BLD VENIPUNCTURE: CPT

## 2024-03-12 PROCEDURE — 83735 ASSAY OF MAGNESIUM: CPT

## 2024-03-12 PROCEDURE — 93005 ELECTROCARDIOGRAM TRACING: CPT | Performed by: STUDENT IN AN ORGANIZED HEALTH CARE EDUCATION/TRAINING PROGRAM

## 2024-03-12 PROCEDURE — 94761 N-INVAS EAR/PLS OXIMETRY MLT: CPT

## 2024-03-12 PROCEDURE — 93010 ELECTROCARDIOGRAM REPORT: CPT | Performed by: INTERNAL MEDICINE

## 2024-03-12 PROCEDURE — 71045 X-RAY EXAM CHEST 1 VIEW: CPT

## 2024-03-12 PROCEDURE — 83605 ASSAY OF LACTIC ACID: CPT

## 2024-03-12 PROCEDURE — 85520 HEPARIN ASSAY: CPT

## 2024-03-12 PROCEDURE — 6370000000 HC RX 637 (ALT 250 FOR IP): Performed by: STUDENT IN AN ORGANIZED HEALTH CARE EDUCATION/TRAINING PROGRAM

## 2024-03-12 PROCEDURE — 99232 SBSQ HOSP IP/OBS MODERATE 35: CPT | Performed by: NURSE PRACTITIONER

## 2024-03-12 PROCEDURE — 84100 ASSAY OF PHOSPHORUS: CPT

## 2024-03-12 PROCEDURE — 84484 ASSAY OF TROPONIN QUANT: CPT

## 2024-03-12 PROCEDURE — 2580000003 HC RX 258: Performed by: STUDENT IN AN ORGANIZED HEALTH CARE EDUCATION/TRAINING PROGRAM

## 2024-03-12 PROCEDURE — 92610 EVALUATE SWALLOWING FUNCTION: CPT

## 2024-03-12 RX ORDER — AMIODARONE HYDROCHLORIDE 200 MG/1
100 TABLET ORAL DAILY
Status: DISCONTINUED | OUTPATIENT
Start: 2024-03-13 | End: 2024-03-15 | Stop reason: HOSPADM

## 2024-03-12 RX ORDER — ASPIRIN 81 MG/1
81 TABLET, CHEWABLE ORAL EVERY MORNING
Status: DISCONTINUED | OUTPATIENT
Start: 2024-03-13 | End: 2024-03-12

## 2024-03-12 RX ORDER — SODIUM CHLORIDE, SODIUM LACTATE, POTASSIUM CHLORIDE, AND CALCIUM CHLORIDE .6; .31; .03; .02 G/100ML; G/100ML; G/100ML; G/100ML
500 INJECTION, SOLUTION INTRAVENOUS ONCE
Status: COMPLETED | OUTPATIENT
Start: 2024-03-12 | End: 2024-03-12

## 2024-03-12 RX ORDER — CLOPIDOGREL BISULFATE 75 MG/1
75 TABLET ORAL DAILY
Status: DISCONTINUED | OUTPATIENT
Start: 2024-03-12 | End: 2024-03-15 | Stop reason: HOSPADM

## 2024-03-12 RX ORDER — ATORVASTATIN CALCIUM 40 MG/1
80 TABLET, FILM COATED ORAL DAILY
Status: DISCONTINUED | OUTPATIENT
Start: 2024-03-12 | End: 2024-03-15 | Stop reason: HOSPADM

## 2024-03-12 RX ADMIN — HEPARIN SODIUM 10 UNITS/KG/HR: 10000 INJECTION, SOLUTION INTRAVENOUS at 22:05

## 2024-03-12 RX ADMIN — ATORVASTATIN CALCIUM 80 MG: 40 TABLET, FILM COATED ORAL at 21:53

## 2024-03-12 RX ADMIN — SODIUM CHLORIDE, POTASSIUM CHLORIDE, SODIUM LACTATE AND CALCIUM CHLORIDE 500 ML: 600; 310; 30; 20 INJECTION, SOLUTION INTRAVENOUS at 10:31

## 2024-03-12 RX ADMIN — CLOPIDOGREL BISULFATE 75 MG: 75 TABLET ORAL at 21:53

## 2024-03-12 NOTE — PROGRESS NOTES
Another NG placed in right nare with patient's permission. Patient tolerated well. Stat Xray ordered to verify placement. Dr Tipton notified and sitter placed at bedside to keep NG in.

## 2024-03-12 NOTE — PROGRESS NOTES
Physical Therapy Treatment Note  Name: Eyal Anguiano MRN: [id number] :   1945   Date:  3/12/2024   Admission Date: 3/10/2024 Room:  [address] [place], [place] [address]A   Restrictions/Precautions:  General Precautions, Fall Risk, R sided deficits    Communication with other providers:  BIANCA Shanks cleared patient to be seen on this date. Co-session with ESME Nieves student and ESME Berg for safety and tolerance    Subjective:  Patient states:  \"yeah, she was here and is coming back.\" In regards  to if speech therapy had seen patient on this date. Patient agreeable to participate in therapy services.     Pain:   Location, Type, Intensity (0/10 to 10/10):  did not state numerical value    Objective:    Observation:  Patient approached presenting in semi-fowlers position and agreeable to participate in therapy services.     Treatment, including education/measures:  Facilitation of bed mobility in preparation for OOB related tasks. Patient requiring maxA x2 for supine>sitting EOB for trunk and LE management. Once at EOB, patient began trying to scoot forward at EOB, requiring cueing to remain in static seated position while gait belt was donned. Patient requiring maxA x2 for SPT from EOB>bedside chair. Once in chair, patient initially was responsive and was communicating, but became unresponsive and diaphoretic. Patient was dependent x2 for transfer from bedside chair>bed. RRT was called due to patient presenting unresponsive and diaphoretic. Patient was left with RRT.     Assessment / Impression:    Patient presenting with change in medical status warranting RRT to be called. Therapist will continue to follow for further skilled PT needs.     Patient's tolerance of treatment:  poor   Adverse Reaction: RRT called  Significant change in status and impact:  RRT called due to unresponsiveness  Barriers to improvement:  change in medical status    Plan for Next Session:    Possible re-evaluation pending improvement in medical status.

## 2024-03-12 NOTE — CARE COORDINATION
Met with patientand discussed ARU.  Explained to patient the required 3 hours of therapy a day.  Also explained the average length of stay is 11 days, could be longer or shorter depending on recommendations of therapy and Dr. Puckett.  Patient expresses his understanding and states he's agreeable to admit to ARU.        1000:  Rapid response was called on patient while working with therapy.  Per MD while I was meeting with patient that it would potentially be 3-4 days before patient would be medically ready.     ARU will follow for progress with therapy.

## 2024-03-12 NOTE — PROGRESS NOTES
Occupational Therapy    Occupational Therapy Treatment Note    Name: Eyal Anguiano MRN: [id number] :   1945   Date:  3/12/2024   Admission Date: 3/10/2024 Room:  [facility]     Primary Problem:  Stroke-like symptoms, Rt sided weakness    Restrictions/Precautions:  General Precautions, Fall Risk, IV, Telemetry, BP Cuff, Pulse Ox, Bed/Chair Alarm     Communication with other providers: PT BIANCA Benavides    Subjective:  Patient states:  \"I'm feeling thirsty.\"  Pain: Pt denied pain this date    Objective:    Observation: Pt supine in bed upon OT arrival. Agreeable to OT treatment session. Pt became unresponsive and diaphoretic during session and rapid response called    Objective Measures:  Stable vitals throughout session on room air    Treatment, including education:  Therapeutic Activity Training:   Therapeutic activity training was instructed today.  Cues were given for safety, sequence, UE/LE placement, awareness, and balance.      Pt received supine in bed upon OT arrival. Pt agreeable to OT treatment session. Pt re-educated on role of OT, POC, importance of EOB/OOB activity, and joint protection principles for Rt UE. Pt completed sup-sit max A x2 for trunk, LE, and hip positioning. Pt min A in static sitting EOB. Increased Rt UE hypotonicity noted this date. Pt attempted to initiate scooting hips to EOB in preparation for sit-stand. Pt assisted to scoot hips to EOB max A and performed stand-pivot transfer from bed to chair max A x2. Pt quickly became unresponsive and diaphoretic and completed stand pivot transfer back to bed dependent x2 and sit-sup dependent x2. Rapid response called. Pt left in care of rapid response team at conclusion of OT treatment session.     Assessment / Impression:    Patient's tolerance of treatment: Poor  Adverse Reaction: Pt became unresponsive and diaphoretic during session, rapid response called  Significant change in status and impact: Declined from initial

## 2024-03-12 NOTE — PROGRESS NOTES
RRT called by PT for an unresponsive episode. Patient was assisted to chair, became diaphoretic and unresponsive. Patient put back in bed at that time and was minimally responsive when this nurse entered the room. See PT/OT eval notes. Blood glucose 147, /72. Patient returned back to baseline within approx. 10 minutes. Dr Tipton attending at the bedside. Stat CXR ordered, as well as labs and 500cc LR bolus. Patient stable at this time. Neurology in to assess patient post-rapid as well.

## 2024-03-12 NOTE — PLAN OF CARE
Problem: Discharge Planning  Goal: Discharge to home or other facility with appropriate resources  3/12/2024 1252 by Rimma Peoples RN  Outcome: Progressing  Flowsheets (Taken 3/12/2024 0327 by Pricilla Alcala RN)  Discharge to home or other facility with appropriate resources:   Identify barriers to discharge with patient and caregiver   Arrange for needed discharge resources and transportation as appropriate   Identify discharge learning needs (meds, wound care, etc)  3/11/2024 2345 by Amarilys De Anda, RN  Outcome: Progressing     Problem: Skin/Tissue Integrity  Goal: Absence of new skin breakdown  Description: 1.  Monitor for areas of redness and/or skin breakdown  2.  Assess vascular access sites hourly  3.  Every 4-6 hours minimum:  Change oxygen saturation probe site  4.  Every 4-6 hours:  If on nasal continuous positive airway pressure, respiratory therapy assess nares and determine need for appliance change or resting period.  3/12/2024 1252 by Rimma Peoples RN  Outcome: Progressing  3/11/2024 2345 by Amarilys De Anda RN  Outcome: Progressing     Problem: Safety - Adult  Goal: Free from fall injury  3/12/2024 1252 by Rimma Peoples RN  Outcome: Progressing  3/11/2024 2345 by Amarilys De Anda RN  Outcome: Progressing     Problem: Chronic Conditions and Co-morbidities  Goal: Patient's chronic conditions and co-morbidity symptoms are monitored and maintained or improved  3/12/2024 1252 by Rimma Peoples RN  Outcome: Progressing  Flowsheets (Taken 3/12/2024 0327 by Pricilla Alcala RN)  Care Plan - Patient's Chronic Conditions and Co-Morbidity Symptoms are Monitored and Maintained or Improved: Monitor and assess patient's chronic conditions and comorbid symptoms for stability, deterioration, or improvement  3/11/2024 2345 by Amarilys De Anda, RN  Outcome: Progressing

## 2024-03-12 NOTE — PROGRESS NOTES
bilateral; Gait disturbance; Acute blood loss anemia; S/P CABG x 3; Sternal wound dehiscence; Pneumonia due to infectious organism; Community acquired pneumonia, unspecified laterality; and Stroke-like symptoms on their problem list.  ONSET DATE: this admission    Recent Chest Xray/CT of Chest: see chart    Date of Eval: 3/12/2024  Evaluating Therapist: JUAN MANUEL Ventura    Current Diet level:  Current Diet : NPO  Current Liquid Diet : NPO    Primary Complaint  acute CVA    Pain:  Pain Assessment  Pain Assessment: None - Denies Pain    Reason for Referral  Eyal Anguiano was referred for a bedside swallow evaluation to assess the efficiency of his swallow function, identify signs and symptoms of aspiration and make recommendations regarding safe dietary consistencies, effective compensatory strategies, and safe eating environment.    Impression  Dysphagia Diagnosis: Moderate oral stage dysphagia;Moderate to severe pharyngeal stage dysphagia;Suspected needs further assessment  Dysphagia Outcome Severity Scale: Level 2: Moderate Severe dysphagia- Maximum assistance or maximum use of strategies with partial PO only     Treatment Plan  Requires SLP Intervention: Yes  Duration of Treatment: LOS  D/C Recommendations: Ongoing speech therapy is recommended during this hospitalization;Ongoing speech therapy is recommended at next level of care       Recommended Diet and Intervention  Diet Solids Recommendation: NPO  Liquid Consistency Recommendation: NPO  Recommended Form of Meds: Via alternative means of nutrition  Recommendations: Modified barium swallow study;NPO  Therapeutic Interventions: Patient/Family education;Other (comment) (further evaluation with MBS, SLE when able)    Compensatory Swallowing Strategies       Treatment/Goals  Short-term Goals  Timeframe for Short-term Goals: LOS  Goal 1: Pt will participate in modified barium swallow study to determine safe diet consistencies and additional goals of  care.  Goal 2: Pt will participate in speech/language evaluation to determine additional goals of care.  Goal 3: Pt/caregivers will indicate understanding of education/recommendations.    General  Chart Reviewed: Yes  Behavior/Cognition: Alert  Respiratory Status: Room air  O2 Device: None (Room air)  Communication Observation: Dysarthria  Follows Directions: Simple (inconsistently with max cues)  Patient Positioning: Upright in bed  Baseline Vocal Quality: Dysphonic (weak, strained/rough, reduced loudness)  Volitional Cough: Weak  Prior Dysphagia History: none known prior to admission  Consistencies Administered: Regular;Pureed;Thin - cup;Thin - straw;Thin - teaspoon;Mildly Thick - straw;Mildly Thick - cup;Ice Chips    Vision/Hearing       Oral Motor Deficits  Labial: Right droop  Lingual: Right deviation  Velum: Unable to visualize  Mandible: Restricted  Consistencies Administered: Regular;Pureed;Thin - cup;Thin - straw;Thin - teaspoon;Mildly Thick - straw;Mildly Thick - cup;Ice Chips    Oral Phase Dysfunction  Oral Phase  Oral Phase: Exceptions     Indicators of Pharyngeal Phase Dysfunction   Pharyngeal Phase  Pharyngeal Phase: Exceptions  Pharyngeal Phase   Pharyngeal Phase: Exceptions    Prognosis  Prognosis: Good  Consulted and agree with results and recommendations: Patient;RN    Education  Patient Education: recommendations, POC  Patient Education Response: Needs reinforcement  Safety Devices in place: Yes  Type of devices: All fall risk precautions in place;Left in bed       Therapy Time  SLP Individual Minutes  Time In: 0910  Time Out: 0930  Minutes: 20            JUAN MANUEL Ventura  3/12/2024 12:46 PM

## 2024-03-12 NOTE — CARE COORDINATION
03/12/24 0929   Service Assessment   Patient Orientation Alert and Oriented   Cognition Alert   History Provided By Patient   Primary Caregiver Self   Support Systems Friends/Neighbors   Patient's Healthcare Decision Maker is: Legal Next of Kin   PCP Verified by CM Yes   Prior Functional Level Independent in ADLs/IADLs   Current Functional Level Assistance with the following:;Mobility   Can patient return to prior living arrangement Unknown at present   Ability to make needs known: Fair   Family able to assist with home care needs: No   Would you like for me to discuss the discharge plan with any other family members/significant others, and if so, who? No   Financial Resources Medicare;Medicaid   Community Resources None     CM in to see Pt to initiate discharge planning.  Pt from home with a roommate.  Pt is agreeable to therapy recommendation of inpatient rehab.    CM call to Sherri/ARU with referral.     Pt denies any needs at this time.  CM following

## 2024-03-12 NOTE — PROGRESS NOTES
V2.0  [facility] Hospitalist Progress Note      Name:  Eyal Anguiano /Age/Sex: 1945  (78 y.o. male)   MRN & CSN:  [id number] & [id number] Encounter Date/Time: 3/12/2024 1:24 PM EDT    Location:  [address] [place] [address] PCP: Jacinto Flores MD       Hospital Day: 3    Assessment and Plan:   Eyal Anguiano is a 78 y.o. male with a pmh of  who presents with Stroke-like symptoms     Hospital Problems               Last Modified POA     * (Principal) Stroke-like symptoms 3/10/2024 Yes         Strokelike symptoms with acute small ischemic insult in the region of left corona radiata  Patient complaining of right-sided weakness, initial onset about 4 days ago  Was brought to the hospital due to multiple falls and balance issues(had 4 falls on the day of presentation)  CT of the head with no acute intracranial findings  CTA head and neck without large vessel occlusion  MRI of the brain shows small acute ischemic insult in the region of the left corona radiator.  Interventional neurology on board intervention planned for tomorrow n.p.o. at midnight  Patient already on aspirin and Lipitor, continue  Will continue Coreg for now for blood pressure control  PTOT evaluation  Discussed with neurology started on Eliquis initially once as the patient could not get Eliquis as B12 patient is pending or tomorrow started on heparin drip as per recommendation from interventional neurology.  Patient underlying history of atrial fibrillation will benefit from outpatient follow-up with cardiology     Mechanical falls  Patient complaining of dragging of right foot and losing balance  PT OT evaluation     Leukocytosis  WBC 12.1 on presentation  No concern of sepsis or infection.  Continue to monitor off antibiotics     Lactic acidosis  Only mild.  Lactic acid on presentation 2.1 mmol/liter  No suspicion of sepsis  Repeat lactic acid     Elevated proBNP  Does not appear to have heart failure exacerbation  Appears euvolemic and no  or dissection. 3 vessel aortic arch anatomy demonstrated. There is diffuse calcification in the aortic arch. ADENOPATHY: No significant hilar, mediastinal or axillary adenopathy is seen.. CHEST WALL / LOWER NECK: No significant abnormality. BONES: Accentuated mid to lower thoracic kyphosis. Multiple compression fracture deformities are seen of the T5, T6, T8-T10 and T12 vertebra. These appear chronic, described on prior studies dating to 2012. No significant retropulsion of bone or compromise of the central canal.. CT ABDOMEN AND PELVIS: LIVER: Homogeneous, normal in attenuation.. GALLBLADDER AND BILIARY DUCTS: Status post cholecystectomy.  No intra- or extrahepatic biliary dilatation. PANCREAS: Normal. SPLEEN: Normal. ADRENAL GLANDS: Normal. KIDNEYS AND URETERS: Symmetrical function. No evidence of renal or ureteral obstruction bilaterally. URINARY BLADDER: Mildly distended. REPRODUCTIVE ORGANS: No associated masses. BOWEL: Normal diameter, nonobstructed.  The appendix not visualized. No acute inflammatory changes are seen. Mild distal colonic diverticulosis. LYMPH NODES: No abnormally enlarged nodes. PERITONEUM / RETROPERITONEUM: No ascites or free air. VESSELS: Aorta is normal in caliber. No evidence of aneurysm or dissection.. Celiac axis, SMA, SHAYLA and renal arteries are patent. ABDOMINAL WALL: Unremarkable. BONES: Chronic compression fracture deformity of L3, with moderate canal stenosis at this level, described on a prior MR study from 2012. Mild compression deformity of L1 is seen. Other lumbar vertebrae are maintained in height..     CHEST: Bilateral posterior lower lobe and lingular segment atelectasis. No acute findings in the chest. ABDOMEN/PELVIS: Status post cholecystectomy. No acute findings in the abdomen or pelvis. Note made of multiple compression fracture deformities of thoracic and lumbar vertebrae as described, which appear old, described on previous studies. Electronically signed by Laura

## 2024-03-12 NOTE — PROGRESS NOTES
Nurse called phlebotomy to inquire about 0115 anti-Xa lab draw. Per phlebotomist she will be up on unit to obtain lab draw.

## 2024-03-12 NOTE — PROGRESS NOTES
Neurology Service Progress Note  [facility]   Patient Name: Eyal Anguiano  : 1945        Subjective:   CC: Stroke like symptoms  Chart was reviewed in detail, patient was seen and assessed. He did undergo diagnostic cerebral angiogram on 3/11/24 with Dr. Garcia. He was noted to have irregular filling in the common carotid artery that could be consistent with thrombus. Anticoagulation was recommended. Patient was started on Heparin gtt as he is unable to swallow safely. After working with therapy today, patient noted to have worsening right sided weakness and right facial weakness. On my exam patient noted to have right hemiparesis, speech is more dysarthric today.     Past Medical History:   Diagnosis Date    Acid reflux     Acute on chronic combined systolic and diastolic heart failure ([facility]) 2015    CAD (coronary artery disease)     Sees Dr. Laird    CHF (congestive heart failure) ([facility])     Fall 2016    H/O cardiovascular stress test 2015    cardiolite-significant ischemia,EF69%, Pt sent to ER.    H/O Doppler lower venous ultrasound 04/15/2019    No DVT, Significant reflux noted in the Left Popliteal vein.    H/O echocardiogram 12/21/15    EF 55%, Mild TR MR.     H/O percutaneous left heart catheterization 12/19/15    EF 55%. Lt. main 50%, LAD 99% mid, Diag 70%. %,      History of  24 hr Holter monitoring 2016    rhythm is sinus. Pr normal, wide QRS- 2 singles ve's , 232 single SVE's, 4 paired, 2 runs of svt    History of fractured rib 2016    \"Fell In My [place] And Fractured 3 Ribs Right Side\"    [place] (hard of hearing)     Bilateral Hearing Aids    Hx of Doppler echocardiogram 2017    EF50-60%,normal    Hyperlipidemia     Hypertension     S/P CABG x 3 2015    SEGAL to LAD & Diag, SVG to Cx M1    Shortness of breath on exertion     Teeth missing     Upper And Lower    Vertebral compression fracture (HCC)         Wears glasses     [place]  angiogram 3/11/23.   Due to concern for common carotid thrombus patient was initiated on heparin gtt.   Continue  atorvastatin 80 mg daily for secondary stroke prevention  Will discontinue aspirin, not needed for stroke prevention in addition to anticoagulant    A1C pending  PT/OT/ST as recommended  Will need follow up in stroke clinic at discharge      Thank you for allowing us to participate in the care of your patient.  If there are any questions regarding evaluation please feel free to contact us.     JOSE Ames - GRAHAM, 3/12/2024     ------------------------------------    Attending Note:  I have rounded on this patient with Esther Drew CNP. I have reviewed the chart and we have discussed this case in detail. The patient was seen and examined by myself. Pertinent labs and imaging have been personally reviewed.     Mr Anguiano is flaccid today in the right arm and leg which is worse compared to yesterday morning.  He endorses shortness of breath and is tachypneic with coarse respirations on my exam today -suspected aspiration.  We discussed indication for repeat head CT to rule out intracranial hemorrhage, but discussed with both patient and nurse that this should only be done when he is stabilized from a respiratory standpoint.  RN notified hospitalist as well.  Agree with heparin for now.  Patient denies headache.  Agree with 40 mg Lipitor.  May need to consider PEG placement in the coming days.  We will continue to follow.    Greater than 60 minutes total were spent on this patient's case including chart review, reviewing prior and recent imaging, obtaining history, examining the patient, and and documentation.    Mary Molina DO 3/12/2024 2:46 PM

## 2024-03-12 NOTE — PROGRESS NOTES
NG tube placed at 1225 in right nare. KUB completed and NG tube was in correct position. Patient was known to have pulled out NG tube at approximately 1405. Dr Tipton notified.

## 2024-03-12 NOTE — PROGRESS NOTES
RUE/RLE  Tone and bulk normal        Deep Tendon Reflexes: 2/4 biceps, triceps, brachioradialis, patellar, and achilles b/l; flexor plantar responses b/l     Sensation: Intact light touch/pinprick/ diminished RUE/RLE     Coordination/Cerebellum:       Tremors--none   Rapidly alternating movements: Unable to complete on the Right    Heel-to-Shin: Unable to complete on the Right   Finger-to-Nose: Unable to complete on the Right   Gait/Station: Deferred    Labs:   Recent Labs     03/10/24  1535 03/11/24  0341 03/11/24  2032   WBC 12.1* 11.8* 13.4*   * 138  --    K 4.4 3.8  --    CL 96* 100  --    CO2 25 24  --    BUN 15 20  --    CREATININE 0.7* 1.1  --    GLUCOSE 146* 123*  --    INR 1.2  --  1.1   ,Last TSH Results[unfilled],Last Free T4 Results[unfilled],[unfilled],[unfilled]  Last Liver Function Results:  @LABRCNTIP(ALT:3,AST:3,BILITOTAL:3,BILIDIR:3,ALKPHOS:3)@     Imaging Studies:     CT Head:     IMPRESSION:     Cerebral atrophy.     Evidence of diffuse chronic small vessel white matter disease bilaterally.     No acute intracranial findings.     Electronically signed by Laura Samaniego MD             Specimen Collected: 03/10/24 15:19 EDT Last Resulted: 03/10/24 15:21 ED                  CTA Head and Neck:     IMPRESSION:     Narrowing of the distal ICA bilaterally as described. Poststenotic dilatation of the distal right ICA. Slightly irregular, underperfused left MCA when compared with the right, with no obvious occlusion.     Patent vertebral and basilar arteries, and posterior circulation.     No acute intracranial findings.     Electronically signed by Laura Samaniego MD     IMPRESSION:     Diffuse calcific plaque throughout the common carotid arteries, carotid bifurcations extending into the proximal ICA bilaterally. Findings result in moderate stenosis of the proximal ICA bilaterally. There is calcific plaque projecting within the lumen   of the left mid CCA, resulting in critical mid CCA stenosis..

## 2024-03-12 NOTE — PLAN OF CARE
Problem: Discharge Planning  Goal: Discharge to home or other facility with appropriate resources  Outcome: Progressing     Problem: Skin/Tissue Integrity  Goal: Absence of new skin breakdown  Description: 1.  Monitor for areas of redness and/or skin breakdown  2.  Assess vascular access sites hourly  3.  Every 4-6 hours minimum:  Change oxygen saturation probe site  4.  Every 4-6 hours:  If on nasal continuous positive airway pressure, respiratory therapy assess nares and determine need for appliance change or resting period.  Outcome: Progressing     Problem: Safety - Adult  Goal: Free from fall injury  Outcome: Progressing     Problem: Chronic Conditions and Co-morbidities  Goal: Patient's chronic conditions and co-morbidity symptoms are monitored and maintained or improved  Outcome: Progressing

## 2024-03-12 NOTE — PROGRESS NOTES
This  responded to the rapide response code and visited with patient after the crisis. Patient did not verbalized any needs during the visit. But was watching and looking like someone who was trying to figure out what was going on. This  is not sure if patient was understanding him during the visit.        03/12/24 1028   Encounter Summary   Encounter Overview/Reason  Initial Encounter;Crisis   Service Provided For: Patient   Referral/Consult From: [facility]   Support System Other (Comment)   Last Encounter  03/12/24   Complexity of Encounter Low   Begin Time 0945   End Time  1000   Total Time Calculated 15 min   Crisis   Type Rapid Response   Spiritual/Emotional needs   Type Spiritual Support   Grief, Loss, and Adjustments   Type Adjustment to illness   Assessment/Intervention/Outcome   Assessment Coping   Intervention Active listening;Empowerment;Sustaining Presence/Ministry of presence;Nurtured Hope   Outcome Coping;Encouraged;Engaged in conversation;Expressed Gratitude;Optimistic   Plan and Referrals   Plan/Referrals Continue Support (comment)

## 2024-03-13 ENCOUNTER — APPOINTMENT (OUTPATIENT)
Dept: GENERAL RADIOLOGY | Age: 79
DRG: 065 | End: 2024-03-13
Payer: MEDICARE

## 2024-03-13 LAB
ANION GAP SERPL CALCULATED.3IONS-SCNC: 15 MMOL/L (ref 7–16)
ANTI-XA UNFRAC HEPARIN: 0.39 IU/ML (ref 0.3–0.7)
ANTI-XA UNFRAC HEPARIN: 0.43 IU/ML (ref 0.3–0.7)
BUN SERPL-MCNC: 56 MG/DL (ref 6–23)
CALCIUM SERPL-MCNC: 8.6 MG/DL (ref 8.3–10.6)
CHLORIDE BLD-SCNC: 103 MMOL/L (ref 99–110)
CO2: 22 MMOL/L (ref 21–32)
CREAT SERPL-MCNC: 1.3 MG/DL (ref 0.9–1.3)
EKG ATRIAL RATE: 97 BPM
EKG DIAGNOSIS: NORMAL
EKG P AXIS: 30 DEGREES
EKG P-R INTERVAL: 168 MS
EKG Q-T INTERVAL: 428 MS
EKG QRS DURATION: 136 MS
EKG QTC CALCULATION (BAZETT): 543 MS
EKG R AXIS: 46 DEGREES
EKG T AXIS: 0 DEGREES
EKG VENTRICULAR RATE: 97 BPM
ESTIMATED AVERAGE GLUCOSE: 103 MG/DL
GFR SERPL CREATININE-BSD FRML MDRD: 56 ML/MIN/1.73M2
GLUCOSE BLD-MCNC: 154 MG/DL (ref 70–99)
GLUCOSE SERPL-MCNC: 130 MG/DL (ref 70–99)
HBA1C MFR BLD: 5.2 % (ref 4.2–6.3)
HCT VFR BLD CALC: 34.5 % (ref 42–52)
HEMOGLOBIN: 11.6 GM/DL (ref 13.5–18)
MAGNESIUM: 2.5 MG/DL (ref 1.8–2.4)
MCH RBC QN AUTO: 31 PG (ref 27–31)
MCHC RBC AUTO-ENTMCNC: 33.6 % (ref 32–36)
MCV RBC AUTO: 92.2 FL (ref 78–100)
PDW BLD-RTO: 12.3 % (ref 11.7–14.9)
PHOSPHORUS: 4.3 MG/DL (ref 2.5–4.9)
PLATELET # BLD: 309 K/CU MM (ref 140–440)
PMV BLD AUTO: 9.4 FL (ref 7.5–11.1)
POTASSIUM SERPL-SCNC: 4.1 MMOL/L (ref 3.5–5.1)
RBC # BLD: 3.74 M/CU MM (ref 4.6–6.2)
SODIUM BLD-SCNC: 140 MMOL/L (ref 135–145)
WBC # BLD: 20.5 K/CU MM (ref 4–10.5)

## 2024-03-13 PROCEDURE — 92526 ORAL FUNCTION THERAPY: CPT

## 2024-03-13 PROCEDURE — 36415 COLL VENOUS BLD VENIPUNCTURE: CPT

## 2024-03-13 PROCEDURE — 74230 X-RAY XM SWLNG FUNCJ C+: CPT

## 2024-03-13 PROCEDURE — 85520 HEPARIN ASSAY: CPT

## 2024-03-13 PROCEDURE — 83735 ASSAY OF MAGNESIUM: CPT

## 2024-03-13 PROCEDURE — 1200000000 HC SEMI PRIVATE

## 2024-03-13 PROCEDURE — 97530 THERAPEUTIC ACTIVITIES: CPT

## 2024-03-13 PROCEDURE — 93010 ELECTROCARDIOGRAM REPORT: CPT | Performed by: INTERNAL MEDICINE

## 2024-03-13 PROCEDURE — 99232 SBSQ HOSP IP/OBS MODERATE 35: CPT | Performed by: NURSE PRACTITIONER

## 2024-03-13 PROCEDURE — 85027 COMPLETE CBC AUTOMATED: CPT

## 2024-03-13 PROCEDURE — 82962 GLUCOSE BLOOD TEST: CPT

## 2024-03-13 PROCEDURE — 92611 MOTION FLUOROSCOPY/SWALLOW: CPT

## 2024-03-13 PROCEDURE — 94761 N-INVAS EAR/PLS OXIMETRY MLT: CPT

## 2024-03-13 PROCEDURE — 84100 ASSAY OF PHOSPHORUS: CPT

## 2024-03-13 PROCEDURE — 80048 BASIC METABOLIC PNL TOTAL CA: CPT

## 2024-03-13 NOTE — PROCEDURES
INSTRUMENTAL SWALLOW REPORT  MODIFIED BARIUM SWALLOW    NAME: Eyal Anguiano   : 1945  MRN: [id number]       Date of Eval: 3/13/2024     Ordering Physician: Dr. Mccormack  Radiologist: IR radiologist     Referring Diagnosis(es): Referring Diagnosis: CVA    Past Medical History:  has a past medical history of Acid reflux, Acute on chronic combined systolic and diastolic heart failure (HCC), CAD (coronary artery disease), CHF (congestive heart failure) (HCC), Fall, H/O cardiovascular stress test, H/O Doppler lower venous ultrasound, H/O echocardiogram, H/O percutaneous left heart catheterization, History of  24 hr Holter monitoring, History of fractured rib, [place] (hard of hearing), Hx of Doppler echocardiogram, Hyperlipidemia, Hypertension, S/P CABG x 3, Shortness of breath on exertion, Teeth missing, Vertebral compression fracture (HCC), Wears glasses, and [place] teeth extracted.  Past Surgical History:  has a past surgical history that includes sinus surgery ('s); eye surgery (); [place] tooth extraction; Cholecystectomy, laparoscopic (); Cardiac surgery (2015); Exploration of undescended testicle (Bilateral, ); Appendectomy (); and other surgical history (2018).            Date of Prior Study: n/a  Type of Study: Initial MBS       Patient Complaints/Reason for Referral:  Eyal Anguiano was referred for a MBS to assess the efficiency of his/her swallow function, assess for aspiration, and to make recommendations regarding safe dietary consistencies, effective compensatory strategies, and safe eating environment.  Patient complaints: wanting to eat    Onset of problem:   Date of Onset: this admission            Behavior/Cognition/Vision/Hearing:  Behavior/Cognition: Alert;Cooperative;Pleasant mood  Hearing: Exceptions to WFL  Hearing Exceptions: Hard of hearing/hearing concerns    Impressions:  Eyal Anguiano was seen for a modified barium swallow study after being  cup;Moderately Thickstraw;Mildly Thick teaspoon;Moderately Thickteaspoon;Moderately Thick cup;Pureed        Dysphagia Outcome Severity Scale: Level 1: Severe dysphagia- NPO. Unable to tolerate any PO safely  Penetration-Aspiration Scale (PAS): 8 - Material Enters the airway, passes below the vocal folds, and no effort is made to eject    Recommended Diet:  Solid consistency: NPO  Liquid consistency: NPO       Medication administration: Via NG/PEG    Safe Swallow Protocol:                    Recommendations/Treatment  Requires SLP Intervention: Yes        D/C Recommendations: Ongoing speech therapy is recommended during this hospitalization;Ongoing speech therapy is recommended at next level of care         Recommended Exercises:    Therapeutic Interventions: Patient/Family education;Other (comment);Therapeutic PO trials with SLP         Education: Images and recommendations were reviewed with Eyal Anguiano following this exam.   Patient Education: recommendations, POC  Patient Education Response: Needs reinforcement    Safety Devices  Safety Devices in place: Yes  Type of devices: All fall risk precautions in place;Left in bed      Goals:    Long Term:               Short Term:     Goal 1: Pt will participate in limited pleasure feeds of honey thick liquids by spoon sip/puree  Goal 2: Pt will participate in SLE to determine additional goals of care  Goal 3: Pt/caregivers will indicate understanding of education/recommendations. Targeted this date- reviewed results of MBSS and recommendations with pt, nursing and physician                  Pain             Therapy Time:   Individual Concurrent Group Co-treatment   Time In 0900         Time Out 0950         Minutes 50               Makayla Brown MS, CCC-SLP, 3/13/2024

## 2024-03-13 NOTE — PROGRESS NOTES
Physical Therapy Treatment Note  Name: Eyal Anguiano MRN: [id number] :   1945   Date:  3/13/2024   Admission Date: 3/10/2024 Room:  [address] [place], [place] [address]A   Restrictions/Precautions:  General Precautions, Fall Risk, R sided deficits  Communication with other providers:  BIANCA Cobos  Subjective:  Patient states:  \"I want a coffee or a coke.\"  Pain:   Location, Type, Intensity (0/10 to 10/10):  denies  Objective:    Observation:  Patient approached presenting in bed and agreeable to participate in therapy services.   Limited to bed level interventions on this date due to elevated BP.  Objective Measures:  BP presenting 163/114 in supine, at end of session was 157/97    Treatment, including education/measures:  Patient presenting in semi-fowlers position and agreeable to participate in therapy services. Therapist communicated with patient regarding moving R side on this date. Patient presenting with flaccid RUE and RLE. Therapist providing verbal and tactile cueing to attempt moving extremities, but was unable.     Facilitation of bed mobility to provide pressure relief to posterior aspect. Patient rolled to the R with Rso and to the L with maxA. Patient performed x2 bouts.     Assessment / Impression:    Patient presenting with significant R sided deficits on this date. Patient presenting flaccid in both RUE and RLE. Patient would benefit from re-evaluation once BP is stabilized.     Patient's tolerance of treatment:  fair   Adverse Reaction: none  Significant change in status and impact:  none  Barriers to improvement:  BP,  R sided hemiparesis  Plan for Next Session:    Potential re-evaluation pending medical stability.     Time in:  1406  Time out:  1420  Timed treatment minutes:  14  Total treatment time:  14    Previously filed items:  Social/Functional History  Lives With: Other (comment) (landlord)  Type of Home: House  Home Layout: One level  Home Access: Stairs to enter without rails  Entrance Stairs -

## 2024-03-13 NOTE — PROGRESS NOTES
Left Ventricle: Normal left ventricular systolic function with a visually estimated EF of 50 - 55%. Left ventricle size is normal. Mildly increased wall thickness.    Image quality is technically difficult due to poor windows.    Left Ventricle: Normal wall motion.    Mitral Valve: Annular calcification of the mitral valve.    Interatrial Septum: Bubble study appears negative; poor quality due to limited windows.    Pericardium: No pericardial effusion.     All imaging was personally reviewed    Assessment/Plan:      78-year-old male presenting with an acute left MCA territory stroke. CTA with diffuse calcification of the CCA, ICA and intracranial ICA greater on the left.  L MCA underperfused.       -Neuroimaging:  As above.  -TTE:  EF 50-55% with no regional wall abnormalities.  Bubble study in negative   -Pertinent images discussed/reviewed with Dr. Garcia who has fully participated in the care of this patient and agrees with plan.   -40% stenosis at the origin of the dominant right vertebral artery, the left vertebral artery is not identified on this angiogram.  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  9 mm dolichoectatic enlargement of the right supraclinoid internal carotid artery.  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 Diffuse atherosclerotic changes seen in the left common and internal carotid arteries there are some irregular filling in the common carotid artery that could be consistent with thrombus. Recommend anticoagulation.  There is no stenosis that measures 50% or greater that would require intervention.  High-grade stenosis of prevertebral left subclavian artery.  There is some retrograde filling from an external carotid artery branch on the left that does help provide flow to the left subclavian artery.  -Recommend repeat cerebral vascular imaging in 1 month.  Follow up with Dr. Herrera office as and outpatient   -Continue Heparin drip, transition to oral  anticoagulation as patient is able to take po medications.  Video swallow planned for today.    -Continue Lipitor for secondary stroke prevention  -PT/OT/ST to evaluate and treat        Electronically signed by JOSE Lewis CNP on 3/13/2024 at 8:40 AM   3/13/2024

## 2024-03-13 NOTE — PROGRESS NOTES
stroke prevention in addition to anticoagulant    A1C 5.2  PT/OT/ST as recommended  Will need follow up in stroke clinic at discharge  Will continue to follow loosely pending clinical course    Patient was discussed with attending neurologist Dr. Molina, IM Dr. Tipton    > 40 minutes of time spent included chart review, obtaining history, patient examination, developing plan of care, and documentation.           Thank you for allowing us to participate in the care of your patient.  If there are any questions regarding evaluation please feel free to contact us.     Esther Drew, JOSE - CNP, 3/13/2024

## 2024-03-13 NOTE — PLAN OF CARE
"-- Message is from the PEER--    Reason for Call: The patient is requesting a call back regarding medication. The patient would like to have her pain medication increased back to oxyCODONE-acetaminophen (PERCOCET)  MG per tablet to every 8 hours, instead of every 12 hours. Please contact the patient as soon as possible. Caller Information       Type Contact Phone    05/16/2019 10:19 AM Phone (Incoming) Boy Tucker (Self) [phone] (H)          Alternative phone number: n/a    Turnaround time given to caller: ""This message will be sent to [facility] Provider's name]. The clinical team will fulfill your request as soon as they review your message. \""    " I saw the patient and extensive discussion was done in detail with the patient.  Ultimate consensus with the patient is after the patient states \"I would rather take my chances and eat food and I then to stay hungry \"    Feeding tube discussion was done with the patient patient is reluctant to go in that direction patient is alert and oriented X4.      After extensive discussion patient has been made hospice comfort care.  Orders to be placed.  Palliative care has been consulted.     Patient can have comfort feeds as tolerated    Advance care planning was done and time spent for that was around 25 minutes

## 2024-03-13 NOTE — PROGRESS NOTES
of aneurysm or dissection.. Celiac axis, SMA, SHAYLA and renal arteries are patent. ABDOMINAL WALL: Unremarkable. BONES: Chronic compression fracture deformity of L3, with moderate canal stenosis at this level, described on a prior MR study from 2012. Mild compression deformity of L1 is seen. Other lumbar vertebrae are maintained in height..     CHEST: Bilateral posterior lower lobe and lingular segment atelectasis. No acute findings in the chest. ABDOMEN/PELVIS: Status post cholecystectomy. No acute findings in the abdomen or pelvis. Note made of multiple compression fracture deformities of thoracic and lumbar vertebrae as described, which appear old, described on previous studies. Electronically signed by Laura Samaniego MD    CT CERVICAL SPINE WO CONTRAST    Result Date: 3/10/2024  CT OF THE CERVICAL SPINE WITHOUT CONTRAST: HISTORY: Fall, neck pain. TECHNIQUE: Thin section axial images were obtained through the cervical spine, without contrast. Coronal and sagittal reformatting performed. Up-to-date CT equipment and radiation dose reduction techniques are utilized. COMPARISON: None. FINDINGS: There is an accentuated cervical lordosis. There is a minimal retrolisthesis of C4 on C5. Vertebral alignment is otherwise maintained. Bony structures appear diffusely osteoporotic. There is a mild anterior wedge deformity of the C6 vertebra, which appears nonacute. There is no significant bony compromise of the central canal at any level. No acute fracture or traumatic subluxation is seen. Prevertebral and paraspinal soft tissues are unremarkable.     Accentuated cervical lordosis. Old appearing mild anterior compression deformity of the C6 vertebra. No acute findings in the cervical spine. Electronically signed by Laura Samaniego MD    CT HEAD WO CONTRAST    Result Date: 3/10/2024  CT OF THE HEAD WITHOUT CONTRAST: HISTORY: Head trauma, headache. TECHNIQUE: Thin section axial images were obtained per routine protocol. No  contrast given. Coronal and sagittal reformatting performed. Up-to-date CT equipment and radiation dose reduction techniques were utilized. COMPARISON: 12/24/2015. FINDINGS: The ventricular system is within normal limits. No extra-axial collections, mass-effect or midline shift is seen. There is cerebral atrophy. Decreased attenuation changes are seen in the white matter bilaterally. Multiple foci of decreased attenuation consistent with small lacunar infarcts are seen of the bilateral basal ganglia, and left thalamus. There are no other significant abnormal areas of brain attenuation, and there is no evidence of acute intracranial hemorrhage. The bony calvarium is intact. Paranasal sinuses and mastoid air cell complexes are aerated bilaterally.     Cerebral atrophy. Evidence of diffuse chronic small vessel white matter disease bilaterally. No acute intracranial findings. Electronically signed by Laura Samaniego MD      Electronically signed by Ksenia Tipton MD on 3/13/2024 at 2:36 PM

## 2024-03-14 LAB
ANION GAP SERPL CALCULATED.3IONS-SCNC: 15 MMOL/L (ref 7–16)
ANTI-XA UNFRAC HEPARIN: 0.41 IU/ML (ref 0.3–0.7)
BUN SERPL-MCNC: 70 MG/DL (ref 6–23)
CALCIUM SERPL-MCNC: 9 MG/DL (ref 8.3–10.6)
CHLORIDE BLD-SCNC: 105 MMOL/L (ref 99–110)
CO2: 24 MMOL/L (ref 21–32)
CREAT SERPL-MCNC: 1.2 MG/DL (ref 0.9–1.3)
GFR SERPL CREATININE-BSD FRML MDRD: >60 ML/MIN/1.73M2
GLUCOSE SERPL-MCNC: 134 MG/DL (ref 70–99)
HCT VFR BLD CALC: 32.9 % (ref 42–52)
HEMOGLOBIN: 11 GM/DL (ref 13.5–18)
MAGNESIUM: 2.9 MG/DL (ref 1.8–2.4)
MCH RBC QN AUTO: 31.8 PG (ref 27–31)
MCHC RBC AUTO-ENTMCNC: 33.4 % (ref 32–36)
MCV RBC AUTO: 95.1 FL (ref 78–100)
PDW BLD-RTO: 12.3 % (ref 11.7–14.9)
PHOSPHORUS: 3 MG/DL (ref 2.5–4.9)
PLATELET # BLD: 323 K/CU MM (ref 140–440)
PMV BLD AUTO: 9.8 FL (ref 7.5–11.1)
POTASSIUM SERPL-SCNC: 3.7 MMOL/L (ref 3.5–5.1)
RBC # BLD: 3.46 M/CU MM (ref 4.6–6.2)
SODIUM BLD-SCNC: 144 MMOL/L (ref 135–145)
WBC # BLD: 22.3 K/CU MM (ref 4–10.5)

## 2024-03-14 PROCEDURE — 2580000003 HC RX 258: Performed by: NURSE PRACTITIONER

## 2024-03-14 PROCEDURE — 6370000000 HC RX 637 (ALT 250 FOR IP): Performed by: STUDENT IN AN ORGANIZED HEALTH CARE EDUCATION/TRAINING PROGRAM

## 2024-03-14 PROCEDURE — 1200000000 HC SEMI PRIVATE

## 2024-03-14 PROCEDURE — 99232 SBSQ HOSP IP/OBS MODERATE 35: CPT | Performed by: NURSE PRACTITIONER

## 2024-03-14 PROCEDURE — 85520 HEPARIN ASSAY: CPT

## 2024-03-14 PROCEDURE — 6360000002 HC RX W HCPCS: Performed by: STUDENT IN AN ORGANIZED HEALTH CARE EDUCATION/TRAINING PROGRAM

## 2024-03-14 PROCEDURE — 36415 COLL VENOUS BLD VENIPUNCTURE: CPT

## 2024-03-14 PROCEDURE — 83735 ASSAY OF MAGNESIUM: CPT

## 2024-03-14 PROCEDURE — 94761 N-INVAS EAR/PLS OXIMETRY MLT: CPT

## 2024-03-14 PROCEDURE — 84100 ASSAY OF PHOSPHORUS: CPT

## 2024-03-14 PROCEDURE — 80048 BASIC METABOLIC PNL TOTAL CA: CPT

## 2024-03-14 PROCEDURE — 85027 COMPLETE CBC AUTOMATED: CPT

## 2024-03-14 RX ORDER — MORPHINE SULFATE 2 MG/ML
2 INJECTION, SOLUTION INTRAMUSCULAR; INTRAVENOUS
Status: DISCONTINUED | OUTPATIENT
Start: 2024-03-14 | End: 2024-03-15 | Stop reason: HOSPADM

## 2024-03-14 RX ORDER — DIAZEPAM 5 MG/ML
5 INJECTION, SOLUTION INTRAMUSCULAR; INTRAVENOUS EVERY 4 HOURS PRN
Status: DISCONTINUED | OUTPATIENT
Start: 2024-03-14 | End: 2024-03-15 | Stop reason: HOSPADM

## 2024-03-14 RX ADMIN — ATORVASTATIN CALCIUM 80 MG: 40 TABLET, FILM COATED ORAL at 21:23

## 2024-03-14 RX ADMIN — CLOPIDOGREL BISULFATE 75 MG: 75 TABLET ORAL at 08:45

## 2024-03-14 RX ADMIN — AMIODARONE HYDROCHLORIDE 100 MG: 200 TABLET ORAL at 08:45

## 2024-03-14 RX ADMIN — MORPHINE SULFATE 2 MG: 2 INJECTION, SOLUTION INTRAMUSCULAR; INTRAVENOUS at 18:45

## 2024-03-14 RX ADMIN — SODIUM CHLORIDE, PRESERVATIVE FREE 10 ML: 5 INJECTION INTRAVENOUS at 08:44

## 2024-03-14 NOTE — PROGRESS NOTES
1830 Rounded on pt he is sob at rest mouth breathing can d hear crackles.  requested something for congestion. Pulled up in bed oxygen level 95 on room air Hr 108 B/P 95/81 Dr. Ksenia Tipton notified via Sm. Of pt condition. Reminded pt he is activley dyeing and he asked for pain medication  1848 Morphine 2 mg ivp slowly per pt request. B/P set for every 20 minute  1855 Called pt Nephew Pal Jerica up dated him on pt code status and hospice meeting at 0900 03/15 am. His condition now. Nurse offered to pray with pt he did not give a clear message what to do. Nurse whispered in his ear to talk to [place].  1915 resting now eyes closed resp 23 Hr 103  95/76

## 2024-03-14 NOTE — PROGRESS NOTES
Physician Progress Note      PATIENT:               ARNULFO GOLDMAN  [facility] #:                  [id number]  :                       1945  ADMIT DATE:       3/10/2024 2:59 PM  DISCH DATE:  RESPONDING  PROVIDER #:        Ksenia Tipton MD          QUERY TEXT:    Pt admitted with right sided weakness. Pt noted to have ischemic insult to   left corona radiata. If possible, please document in progress notes and   discharge summary the etiology of right sided weakness.    The medical record reflects the following:  Risk Factors: HTN, A-fib, age, CHF  Clinical Indicators: 3/12 neurology note \"MRI brain w acute left stroke Repeat   CT head ordered for worsening right sided weakness.\", 3/12 progress note   \"Strokelike symptoms with acute small ischemic insult in the region of left   corona radiata  Patient complaining of right-sided weakness, initial onset about 4 days ago\"  Treatment: Heparin gtt, statin, cerebral angiogram, MRI brain.      Thank you,  Renay OSCAR, RN, [facility] [phone]  Options provided:  -- Right hemiparesis and dysarthria due to acute CVA  -- Right hemiparesis and dysarthria due to, please specify.  -- Other - I will add my own diagnosis  -- Disagree - Not applicable / Not valid  -- Disagree - Clinically unable to determine / Unknown  -- Refer to Clinical Documentation Reviewer    PROVIDER RESPONSE TEXT:    This patient has right hemiparesis and dysarthria due to acute CVA.    Query created by: Renay Wallace on 3/14/2024 1:38 PM      Electronically signed by:  Ksenia Tipton MD 3/14/2024 1:45 PM

## 2024-03-14 NOTE — PLAN OF CARE
Problem: Discharge Planning  Goal: Discharge to home or other facility with appropriate resources  Outcome: Progressing     Problem: Skin/Tissue Integrity  Goal: Absence of new skin breakdown  Description: 1.  Monitor for areas of redness and/or skin breakdown  2.  Assess vascular access sites hourly  3.  Every 4-6 hours minimum:  Change oxygen saturation probe site  4.  Every 4-6 hours:  If on nasal continuous positive airway pressure, respiratory therapy assess nares and determine need for appliance change or resting period.  Outcome: Progressing     Problem: Safety - Adult  Goal: Free from fall injury  Outcome: Progressing     Problem: Chronic Conditions and Co-morbidities  Goal: Patient's chronic conditions and co-morbidity symptoms are monitored and maintained or improved  Outcome: Progressing     Problem: ABCDS Injury Assessment  Goal: Absence of physical injury  Outcome: Progressing     Problem: Safety - Medical Restraint  Goal: Remains free of injury from restraints (Restraint for Interference with Medical Device)  Description: INTERVENTIONS:  1. Determine that other, less restrictive measures have been tried or would not be effective before applying the restraint  2. Evaluate the patient's condition at the time of restraint application  3. Inform patient/family regarding the reason for restraint  4. Q2H: Monitor safety, psychosocial status, comfort, nutrition and hydration  Outcome: Progressing     Problem: Neurosensory - Adult  Goal: Achieves stable or improved neurological status  Outcome: Progressing     Problem: Gastrointestinal - Adult  Goal: Minimal or absence of nausea and vomiting  Outcome: Progressing  Goal: Maintains or returns to baseline bowel function  Outcome: Progressing  Goal: Maintains adequate nutritional intake  Outcome: Progressing     Problem: Infection - Adult  Goal: Absence of infection at discharge  Outcome: Progressing  Goal: Absence of infection during hospitalization  Outcome:  Progressing     Problem: Metabolic/Fluid and Electrolytes - Adult  Goal: Electrolytes maintained within normal limits  Outcome: Progressing  Goal: Hemodynamic stability and optimal renal function maintained  Outcome: Progressing

## 2024-03-14 NOTE — CARE COORDINATION
Pt with hospice consult.  CM in to see Pt who is agreeable.  List of hospice companies offered, Pt choice of [facility].    CM call to [facility], referral given to Yue.    CM call to Pt emergency contact Yaniv Maynard who Pt states is his landlord.  Neither number is active.    Pt states he does not know who the emergency contact Carlos is.  This contact was removed.    Pt requested this CM call [facility] to contact a friend Natty Ferreira.  CM called [phone], spoke to Pastor Ivory who states she will message Natty.      2:19 PM   CM contacted by Natty who has no family contacts for the Pt.    CM contacted by Adele/[facility], meeting set with Pt for 0900 tomorrow     3:47 PM   Pt provided this CM with family contact of nephew Pal Pizano and his wife, Adrianne Pizano.  Added to emergency contact list at Pt request. CM call to Adrianne, updated of hospice consult at Pt request.  BIANCA Pugh updated, family requested an update from RN.

## 2024-03-14 NOTE — PROGRESS NOTES
status is DNR-CC   -Continue Heparin drip, transition to oral anticoagulation as patient is able.    -Continue Lipitor for secondary stroke prevention  -PT/OT/ST to evaluate and treat         Electronically signed by JOSE Lewis - CNP on 3/14/2024 at 11:17 AM   3/14/2024

## 2024-03-14 NOTE — PROGRESS NOTES
[facility]  DEPARTMENT OF SPEECH/LANGUAGE PATHOLOGY    Eyal Silvio  3/14/2024  [id number]    Noted plan for hospice c/s following MBS completed yesterday revealing severe oropharyngeal dysphagia and silent aspiration of all liquids. Pt declined PEG tube/recommendations. SLP will sign off.    JUAN MANUEL Ventura  3/14/2024  2:16 PM

## 2024-03-14 NOTE — PROGRESS NOTES
V2.0  [facility] [place]      Name:  Eyal Anguiano /Age/Sex: 1945  (78 y.o. male)   MRN & CSN:  [id number] & [id number] Encounter Date/Time: 3/14/2024 1:24 PM EDT    Location:  [address] [place] [address] PCP: Jacinto Flores MD       Hospital Day: 5    Assessment and Plan:   Eyal Anguiano is a 78 y.o. male with a pmh of  who presents with Stroke-like symptoms     Hospital Problems               Last Modified POA     * (Principal) Stroke-like symptoms 3/10/2024 Yes         Strokelike symptoms with acute small ischemic insult in the region of left corona radiata  Patient complaining of right-sided weakness, initial onset about 4 days ago  History of facial droop apparently worse right now  Was brought to the hospital due to multiple falls and balance issues(had 4 falls on the day of presentation)  CT of the head with no acute intracranial findings  CTA head and neck without large vessel occlusion  MRI of the brain shows small acute ischemic insult in the region of the left corona radiator.  Interventional neurology on board intervention planned for tomorrow n.p.o. at midnight  Patient already on aspirin and Lipitor, continue  Will continue Coreg for now for blood pressure control  PTOT evaluation  Discussed with neurology started on Eliquis initially once as the patient could not get Eliquis on heparin drip until cleared for Eliquis.  As per recommendation from interventional neurology.  Was found to have suspected thrombosis in the parotid area.  Will discuss with family regarding palliative discussion.  Hospice has been consulted     Mechanical falls  Patient complaining of dragging of right foot and losing balance     Leukocytosis  WBC 12.1 on presentation  No concern of sepsis or infection.  Continue to monitor off antibiotics     Lactic acidosis  Only mild.  Lactic acid on presentation 2.1 mmol/liter  No suspicion of sepsis  Repeat lactic acid     Elevated proBNP  Does not appear to have

## 2024-03-14 NOTE — PROGRESS NOTES
Brief Neurology Note:  Chart was reviewed in detail. Patient failed video swallow. After lengthy discussion with IM he has opted to speak with hospice as he would not want feeding tube and to remain NPO. He wishes for comfort measures including eating. We will sign off at this time, please contact us with any new concerns.   Esther Drew, APRN - CNP  Neurology

## 2024-03-15 ENCOUNTER — HOSPITAL ENCOUNTER (INPATIENT)
Age: 79
LOS: 1 days | End: 2024-03-16
Attending: FAMILY MEDICINE | Admitting: FAMILY MEDICINE
Payer: COMMERCIAL

## 2024-03-15 VITALS
DIASTOLIC BLOOD PRESSURE: 86 MMHG | HEIGHT: 69 IN | OXYGEN SATURATION: 94 % | SYSTOLIC BLOOD PRESSURE: 98 MMHG | RESPIRATION RATE: 22 BRPM | TEMPERATURE: 97.8 F | WEIGHT: 182.76 LBS | HEART RATE: 104 BPM | BODY MASS INDEX: 27.07 KG/M2

## 2024-03-15 PROBLEM — I63.512 LEFT ACUTE ARTERIAL ISCHEMIC STROKE, MCA (MIDDLE CEREBRAL ARTERY) (HCC): Status: ACTIVE | Noted: 2024-03-12

## 2024-03-15 PROBLEM — T81.32XA STERNAL WOUND DEHISCENCE: Status: RESOLVED | Noted: 2018-01-17 | Resolved: 2024-03-15

## 2024-03-15 PROBLEM — Z51.5 HOSPICE CARE: Status: ACTIVE | Noted: 2024-03-15

## 2024-03-15 PROBLEM — I69.391 DYSPHAGIA AS LATE EFFECT OF CEREBROVASCULAR ACCIDENT (CVA): Status: ACTIVE | Noted: 2024-03-15

## 2024-03-15 PROBLEM — R29.90 STROKE-LIKE SYMPTOMS: Status: RESOLVED | Noted: 2024-03-10 | Resolved: 2024-03-15

## 2024-03-15 PROBLEM — J18.9 COMMUNITY ACQUIRED PNEUMONIA, UNSPECIFIED LATERALITY: Status: RESOLVED | Noted: 2023-06-21 | Resolved: 2024-03-15

## 2024-03-15 PROBLEM — R53.1 RIGHT SIDED WEAKNESS: Status: ACTIVE | Noted: 2024-03-15

## 2024-03-15 PROBLEM — I48.0 PAF (PAROXYSMAL ATRIAL FIBRILLATION) (HCC): Status: ACTIVE | Noted: 2024-03-15

## 2024-03-15 PROBLEM — I67.9 CEREBROVASCULAR DISEASE: Status: ACTIVE | Noted: 2024-03-15

## 2024-03-15 PROBLEM — J18.9 PNEUMONIA DUE TO INFECTIOUS ORGANISM: Status: RESOLVED | Noted: 2023-06-20 | Resolved: 2024-03-15

## 2024-03-15 LAB
ANTI-XA UNFRAC HEPARIN: 0.36 IU/ML (ref 0.3–0.7)
HCT VFR BLD CALC: 29.5 % (ref 42–52)
HEMOGLOBIN: 9.5 GM/DL (ref 13.5–18)
MCH RBC QN AUTO: 31.4 PG (ref 27–31)
MCHC RBC AUTO-ENTMCNC: 32.2 % (ref 32–36)
MCV RBC AUTO: 97.4 FL (ref 78–100)
PDW BLD-RTO: 12.6 % (ref 11.7–14.9)
PLATELET # BLD: 350 K/CU MM (ref 140–440)
PMV BLD AUTO: 10 FL (ref 7.5–11.1)
RBC # BLD: 3.03 M/CU MM (ref 4.6–6.2)
WBC # BLD: 22.7 K/CU MM (ref 4–10.5)

## 2024-03-15 PROCEDURE — 85027 COMPLETE CBC AUTOMATED: CPT

## 2024-03-15 PROCEDURE — 6360000002 HC RX W HCPCS: Performed by: FAMILY MEDICINE

## 2024-03-15 PROCEDURE — 6360000002 HC RX W HCPCS: Performed by: STUDENT IN AN ORGANIZED HEALTH CARE EDUCATION/TRAINING PROGRAM

## 2024-03-15 PROCEDURE — 94761 N-INVAS EAR/PLS OXIMETRY MLT: CPT

## 2024-03-15 PROCEDURE — 36415 COLL VENOUS BLD VENIPUNCTURE: CPT

## 2024-03-15 PROCEDURE — 1250000000 HC SEMI PRIVATE HOSPICE R&B

## 2024-03-15 PROCEDURE — 85520 HEPARIN ASSAY: CPT

## 2024-03-15 RX ORDER — MORPHINE SULFATE 4 MG/ML
4 INJECTION, SOLUTION INTRAMUSCULAR; INTRAVENOUS
Status: CANCELLED | OUTPATIENT
Start: 2024-03-15

## 2024-03-15 RX ORDER — MORPHINE SULFATE 4 MG/ML
4 INJECTION, SOLUTION INTRAMUSCULAR; INTRAVENOUS
Status: DISCONTINUED | OUTPATIENT
Start: 2024-03-15 | End: 2024-03-16 | Stop reason: HOSPADM

## 2024-03-15 RX ORDER — DIAZEPAM 5 MG/ML
2.5 INJECTION, SOLUTION INTRAMUSCULAR; INTRAVENOUS EVERY 4 HOURS PRN
Status: DISCONTINUED | OUTPATIENT
Start: 2024-03-15 | End: 2024-03-16 | Stop reason: HOSPADM

## 2024-03-15 RX ORDER — SODIUM CHLORIDE 9 MG/ML
INJECTION, SOLUTION INTRAVENOUS PRN
Status: CANCELLED | OUTPATIENT
Start: 2024-03-15

## 2024-03-15 RX ORDER — MORPHINE SULFATE 2 MG/ML
2 INJECTION, SOLUTION INTRAMUSCULAR; INTRAVENOUS
Status: CANCELLED | OUTPATIENT
Start: 2024-03-15

## 2024-03-15 RX ORDER — HALOPERIDOL 5 MG/ML
1 INJECTION INTRAMUSCULAR
Status: CANCELLED | OUTPATIENT
Start: 2024-03-15

## 2024-03-15 RX ORDER — GLYCOPYRROLATE 0.2 MG/ML
0.2 INJECTION INTRAMUSCULAR; INTRAVENOUS EVERY 4 HOURS PRN
Status: DISCONTINUED | OUTPATIENT
Start: 2024-03-15 | End: 2024-03-16 | Stop reason: HOSPADM

## 2024-03-15 RX ORDER — ACETAMINOPHEN 650 MG/1
650 SUPPOSITORY RECTAL EVERY 4 HOURS PRN
Status: CANCELLED | OUTPATIENT
Start: 2024-03-15

## 2024-03-15 RX ORDER — HALOPERIDOL 5 MG/ML
1 INJECTION INTRAMUSCULAR
Status: DISCONTINUED | OUTPATIENT
Start: 2024-03-15 | End: 2024-03-16 | Stop reason: HOSPADM

## 2024-03-15 RX ORDER — MORPHINE SULFATE 2 MG/ML
2 INJECTION, SOLUTION INTRAMUSCULAR; INTRAVENOUS
Status: DISCONTINUED | OUTPATIENT
Start: 2024-03-15 | End: 2024-03-16 | Stop reason: HOSPADM

## 2024-03-15 RX ORDER — SODIUM CHLORIDE 0.9 % (FLUSH) 0.9 %
5-40 SYRINGE (ML) INJECTION PRN
Status: DISCONTINUED | OUTPATIENT
Start: 2024-03-15 | End: 2024-03-16 | Stop reason: HOSPADM

## 2024-03-15 RX ORDER — ACETAMINOPHEN 650 MG/1
650 SUPPOSITORY RECTAL EVERY 4 HOURS PRN
Status: DISCONTINUED | OUTPATIENT
Start: 2024-03-15 | End: 2024-03-16 | Stop reason: HOSPADM

## 2024-03-15 RX ORDER — SODIUM CHLORIDE 0.9 % (FLUSH) 0.9 %
5-40 SYRINGE (ML) INJECTION PRN
Status: CANCELLED | OUTPATIENT
Start: 2024-03-15

## 2024-03-15 RX ORDER — DIAZEPAM 5 MG/ML
2.5 INJECTION, SOLUTION INTRAMUSCULAR; INTRAVENOUS EVERY 4 HOURS PRN
Status: CANCELLED | OUTPATIENT
Start: 2024-03-15

## 2024-03-15 RX ORDER — SODIUM CHLORIDE 0.9 % (FLUSH) 0.9 %
5-40 SYRINGE (ML) INJECTION EVERY 12 HOURS SCHEDULED
Status: CANCELLED | OUTPATIENT
Start: 2024-03-15

## 2024-03-15 RX ORDER — GLYCOPYRROLATE 0.2 MG/ML
0.2 INJECTION INTRAMUSCULAR; INTRAVENOUS EVERY 4 HOURS PRN
Status: CANCELLED | OUTPATIENT
Start: 2024-03-15

## 2024-03-15 RX ORDER — SODIUM CHLORIDE 0.9 % (FLUSH) 0.9 %
5-40 SYRINGE (ML) INJECTION EVERY 12 HOURS SCHEDULED
Status: DISCONTINUED | OUTPATIENT
Start: 2024-03-15 | End: 2024-03-16 | Stop reason: HOSPADM

## 2024-03-15 RX ORDER — SODIUM CHLORIDE 9 MG/ML
INJECTION, SOLUTION INTRAVENOUS PRN
Status: DISCONTINUED | OUTPATIENT
Start: 2024-03-15 | End: 2024-03-16 | Stop reason: HOSPADM

## 2024-03-15 RX ADMIN — GLYCOPYRROLATE 0.2 MG: 0.2 INJECTION INTRAMUSCULAR; INTRAVENOUS at 14:59

## 2024-03-15 RX ADMIN — MORPHINE SULFATE 4 MG: 4 INJECTION, SOLUTION INTRAMUSCULAR; INTRAVENOUS at 21:02

## 2024-03-15 RX ADMIN — GLYCOPYRROLATE 0.2 MG: 0.2 INJECTION INTRAMUSCULAR; INTRAVENOUS at 21:02

## 2024-03-15 RX ADMIN — DIAZEPAM 2.5 MG: 5 INJECTION, SOLUTION INTRAMUSCULAR; INTRAVENOUS at 16:18

## 2024-03-15 RX ADMIN — MORPHINE SULFATE 4 MG: 4 INJECTION, SOLUTION INTRAMUSCULAR; INTRAVENOUS at 16:18

## 2024-03-15 RX ADMIN — MORPHINE SULFATE 2 MG: 4 INJECTION, SOLUTION INTRAMUSCULAR; INTRAVENOUS at 12:11

## 2024-03-15 RX ADMIN — MORPHINE SULFATE 2 MG: 2 INJECTION, SOLUTION INTRAMUSCULAR; INTRAVENOUS at 04:14

## 2024-03-15 ASSESSMENT — PAIN SCALES - GENERAL
PAINLEVEL_OUTOF10: 3
PAINLEVEL_OUTOF10: 0

## 2024-03-15 ASSESSMENT — PAIN DESCRIPTION - LOCATION: LOCATION: GENERALIZED

## 2024-03-15 ASSESSMENT — PAIN DESCRIPTION - DESCRIPTORS: DESCRIPTORS: ACHING

## 2024-03-15 ASSESSMENT — PAIN - FUNCTIONAL ASSESSMENT: PAIN_FUNCTIONAL_ASSESSMENT: ACTIVITIES ARE NOT PREVENTED

## 2024-03-15 NOTE — CONSULTS
[facility] Hospice Consult Note    Date: 3/15/2024  Name: Eyal Anguiano  MRN: [id number]  YOB: 1945   Patient's PCP: Jacinto Flores MD  Consultants during acute care: Neurology, Vascular/Interventional Neurology, Speech and Swallowing  Referring Physician: Dr. NELLIE Tipton   Acute care admission date: 3/10/2024     Informant: Chart reviewed, discussed with the patient's nurse and the hospice nurse liaison, and Dr. NELLIE Tipton. I met with the patient at the bedside who provided some information.     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 The patient had CT angio that did not show large vessel occlusion, no hemorrhage, and MRI brain showing acute ischemic area to left corona radiata. The patient has been seen by Neurology and Vascular/Interventional Neurology, and angiogram showed cerebrovascular disease with concern for throimbus in the left lomman carotid artery. . The patient has been on Heparin IV, ASA and Plavix.       The patient has severe oropharyngeal dysphasia and has been seen by Speech therapy with recommendations for NPO and PEG tube. A nasogastric tube had been placed and the patient has removed it. The patient declines PEG and artificial nutrition and hydration, and says that he has had a good life and is \"ready to go\". Hospice was asked to see the patient. The patient has taken meds by mouth and acknowledges the risk but wants to be able to eat. He has some congestion. He had 2 doses of Morphine overnight for generalized pain.       History is difficult to obtain from the patient due to his hearing loss and dysarthria. With effort, it is possible to discuss hospice

## 2024-03-15 NOTE — PROGRESS NOTES
SBAR report called to Ev of [address] [place] [address]. Updated pt on moving to another room.  1525 Pt sounds much better since Rubinol given Iv at 3 pm. Pericare given and taken by charge nurse and Pca to [address] [place] [address] Pt wallet glasses Hospice books all went with him.

## 2024-03-15 NOTE — PROGRESS NOTES
Hospice nurse notified this nurse that patient is going to go [facility] hospice and signed and held orders are in at this time. House supervisor notified by hospice nurse.    Sdae Munoz RN

## 2024-03-15 NOTE — PROGRESS NOTES
4 Eyes Skin Assessment     NAME:  Eyal Anguiano  YOB: 1945  MEDICAL RECORD NUMBER:  [id number]    The patient is being assessed for  Transfer to New Unit    I agree that at least one RN has performed a thorough Head to Toe Skin Assessment on the patient. ALL assessment sites listed below have been assessed.      Areas assessed by both nurses:    Head, Face, Ears, Shoulders, Back, Chest, Arms, Elbows, Hands, Sacrum. Buttock, Coccyx, Ischium, and Legs. Feet and Heels        Does the Patient have a Wound? No noted wound(s)       Stuart Prevention initiated by RN: No  Wound Care Orders initiated by RN: No    Pressure Injury (Stage 3,4, Unstageable, DTI, NWPT, and Complex wounds) if present, place Wound referral order by RN under : No    New Ostomies, if present place, Ostomy referral order under : No     Nurse 1 eSignature: Electronically signed by Ashleigh Vieyra RN on 3/15/24 at 3:54 PM EDT    **SHARE this note so that the co-signing nurse can place an eSignature**    Nurse 2 eSignature: Electronically signed by Sarah Leon LPN on 3/15/24 at 3:57 PM EDT

## 2024-03-15 NOTE — PROGRESS NOTES
Vascular/Interventional Neurology Progress Note  [facility]  Patient Name: Eyal Anguiano     : 1945      Subjective:     The patient was seen and examined.  Chart reviewed.  Patient had episode of shortness of breath overnight.  Morphine given.  He is alert and oriented.  Thankful for the care he has received.  Plan for patient to go to inpatient hospice today.  Offered emotional support.        Objective:   Scheduled Meds:   amiodarone  100 mg Per NG tube Daily    atorvastatin  80 mg Per NG tube Daily    clopidogrel  75 mg Per NG tube Daily    sodium chloride flush  5-40 mL IntraVENous 2 times per day    [Held by provider] apixaban  5 mg Oral BID    sodium chloride flush  5-40 mL IntraVENous 2 times per day    [Held by provider] pantoprazole  40 mg Oral QAM AC     Continuous Infusions:   heparin (PORCINE) Infusion 10 Units/kg/hr (24 1138)    sodium chloride       PRN Meds:.morphine, diazePAM, sodium chloride flush, hydrALAZINE, sodium chloride flush, sodium chloride, potassium chloride **OR** potassium alternative oral replacement **OR** potassium chloride, magnesium sulfate, ondansetron **OR** ondansetron, polyethylene glycol, [Held by provider] acetaminophen **OR** [Held by provider] acetaminophen    Vital Signs:  [unfilled]     General: A&O x 4, NAD, cooperative  HEENT: NC/AT, EOMI, PERRL, mmm, neck supple  Extremities: no edema, no calf tenderness b/l    Neurological Exam:         Mental Status:  A&O to self, location, and year. NAD, speech dysarthric language fluent, repetition and naming intact, follows commands appropriately     Cranial Nerves: Right facial weakness of the CN II-XII intact     Sensation: Decrease sensation to RUE/RLE     Motor: 5/5 LUE/LLE, RUE/RLE 0/5     Coordination:       Tremors-- None      Gait/Station: Deferred    Labs:   Recent Labs     24  1014 24  1247 24  0415 24  0538   WBC  --  16.8* 20.5* 22.3*     --  140

## 2024-03-15 NOTE — H&P
[facility] H+P    Date: 3/15/2024  Name: Eyal Anguiano  MRN: [id number]  YOB: 1945   Patient's PCP: Jacinto Flores MD  Consultants during acute care: Neurology, Vascular/Interventional Neurology, Speech and Swallowing  Hospitalist: Dr. NELLIE Tipton   Acute care admission date: 3/10 to 3/15/2024     Informant: Chart reviewed, discussed with the patient's nurse, the hospice nurse liaison, and Dr. NELLIE Tipton. I met with the patient at the bedside who provided some information.     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 The patient had CT angio that did not show large vessel occlusion, no hemorrhage, and MRI brain showing acute ischemic area to left corona radiata. The patient has been seen by Neurology and Vascular/Interventional Neurology, and angiogram showed cerebrovascular disease with concern for throimbus in the left lomman carotid artery. . The patient has been on Heparin IV, ASA and Plavix.       The patient has severe oropharyngeal dysphasia and has been seen by Speech therapy with recommendations for NPO and PEG tube. A nasogastric tube had been placed and the patient has removed it. The patient declines PEG and artificial nutrition and hydration, and says that he has had a good life and is \"ready to go\". Hospice was asked to see the patient. The patient has taken meds by mouth and acknowledges the risk but wants to be able to eat. He has some congestion. He had 2 doses of Morphine overnight for generalized pain.       History is difficult to obtain from the patient due to his hearing loss and dysarthria. With effort, it is possible to discuss hospice with him. He is  hemiparesis and gait abnormality due to stroke  Paroxysmal atrial fibrillation, not previously anticoagulated due to fall risk  History of coronary artery disease, prior CABG, hypertension, hyperlipidemia   DNR-comfort care     Patient Active Problem List   Diagnosis Code    Coronary artery disease involving native coronary artery of native heart I25.10    Acute on chronic combined systolic and diastolic heart failure ([facility]) I50.43    LVH (left ventricular hypertrophy) due to hypertensive disease I11.9    Carotid stenosis, bilateral I65.23    Gait disturbance R26.9    S/P CABG x 3 Z95.1    Left acute arterial ischemic stroke, MCA (middle cerebral artery) ([facility]) I63.512    Right sided weakness R53.1    Dysphagia as late effect of cerebrovascular accident (CVA) I69.391    Hospice care Z51.5    Cerebrovascular disease I67.9    PAF (paroxysmal atrial fibrillation) ([facility]) I48.0       Certification of Terminal Illness: I certify that this patient is eligible for Hospice services for a terminal diagnosis of left MCA (corona radiata) ischemic infarct with cerebrovascular disease, with a life expectancy predicted to be less than 6 months (and likely days) if this illness follows its expected course.      SHIELA Hardin MD

## 2024-03-15 NOTE — TELEPHONE ENCOUNTER
Patient discharging from [facility] today and planning to go to inpatient hospice today. No further follow up recommended in our office.

## 2024-03-15 NOTE — DISCHARGE SUMMARY
V2.0  Discharge Summary    Name:  Eyal Anguiano /Age/Sex: 1945 (78 y.o. male)   Admit Date: 3/10/2024  Discharge Date: 3/15/24    MRN & CSN:  [id number] & [id number] Encounter Date and Time 3/15/24 11:46 AM EDT    Attending:  No att. providers found Discharging Provider: Ksenia Tipton MD       Hospital Course:     Brief HPI: Eyal Anguiano is a 78 y.o. male with a pmh of  who presents with Stroke-like symptoms     Hospital Problems               Last Modified POA     * (Principal) Stroke-like symptoms 3/10/2024 Yes           Goals of care:  After extensive discussion with the patient plan was done for the patient to be converted to DNR CC.  Hospice has been consulted ultimately patient is going for general inpatient hospice.  Discharged today to hospice    Previously plan was this:    Strokelike symptoms with acute small ischemic insult in the region of left corona radiata  Patient complaining of right-sided weakness, initial onset about 4 days ago  History of facial droop apparently worse right now  Was brought to the hospital due to multiple falls and balance issues(had 4 falls on the day of presentation)  CT of the head with no acute intracranial findings  CTA head and neck without large vessel occlusion  MRI of the brain shows small acute ischemic insult in the region of the left corona radiator.  Interventional neurology on board intervention planned for tomorrow n.p.o. at midnight  Patient already on aspirin and Lipitor, continue  Will continue Coreg for now for blood pressure control  PTOT evaluation  Discussed with neurology started on Eliquis initially once as the patient could not get Eliquis on heparin drip until cleared for Eliquis.  As per recommendation from interventional neurology.  Was found to have suspected thrombosis in the parotid area.  Will discuss with family regarding palliative discussion.  Hospice has been consulted     Mechanical falls  Patient complaining of dragging of

## 2024-03-16 VITALS
OXYGEN SATURATION: 92 % | SYSTOLIC BLOOD PRESSURE: 108 MMHG | HEART RATE: 100 BPM | RESPIRATION RATE: 26 BRPM | TEMPERATURE: 97.2 F | DIASTOLIC BLOOD PRESSURE: 79 MMHG

## 2024-03-16 NOTE — PROGRESS NOTES
0130H: Patient's death verified with Benito RN @ 0140H. Unable to reach listed NOK despite multiple attempts. Dr. Hardin, supervisor, hospice updated. Hospice to send RN to help with patient's  needs. Organ donation updated, patient eligible for donation.     0300H: Hospice nurse able to reach Pal (nephew) and updated regarding patient's passing. OPO updated regarding  home.    Electronically signed by Pal Moreno RN on 3/16/2024 at 3:40 AM

## 2024-03-16 NOTE — DISCHARGE SUMMARY
[facility]    Death Summary    Date: 3/16/2024  Name: Eyal Anguiano  MRN: [id number]  YOB: 1945     Patient's PCP: Jacinto Flores MD  Consultants during acute care: Neurology, Vascular/Interventional Neurology, Speech and Swallowing  Hospitalist: Dr. NELLIE Tipton   Acute care admission date: 3/10 to 3/15/2024   Admit Date: 3/15/2024 to General Inpatient Hospice  Date of Death: 3/16/2024  Time: 0140  Admitting Physician: Pal Hardin MD to General Inpatient Hospice   Discharge Physician: SHIELA Hardin MD  Consultation: none during General Inpatient Hospice stay    Invasive procedures: none during General Inpatient Hospice stay    Discharge Diagnoses:  Left MCA (corona radiata) ischemic infarct with cerebrovascular disease, paroxysmal atrial fibrillation with severe oropharyngeal dysphagia declining PEG tube. .   Severe oropharyngeal dysphagia due to stroke, declines artificial nutrition and hydration and wants to eat if able. He is aware of risks, and will allow comfort feeds.   Right (dominant) hemiparesis and gait abnormality due to stroke  Paroxysmal atrial fibrillation, not previously anticoagulated due to fall risk  History of coronary artery disease, prior CABG, hypertension, hyperlipidemia   DNR-comfort care       Patient Active Problem List   Diagnosis Code    Coronary artery disease involving native coronary artery of native heart I25.10    Acute on chronic combined systolic and diastolic heart failure ([facility]) I50.43    LVH (left ventricular hypertrophy) due to hypertensive disease I11.9    Carotid stenosis, bilateral I65.23    Gait disturbance R26.9    S/P CABG x 3 Z95.1    Left acute arterial ischemic stroke, MCA (middle cerebral artery) ([facility]) I63.512    Right sided weakness R53.1    Dysphagia as late effect of cerebrovascular accident (CVA) I69.391    Hospice care Z51.5    Cerebrovascular disease I67.9    PAF (paroxysmal atrial fibrillation) ([facility]) I48.0